# Patient Record
Sex: MALE | Race: BLACK OR AFRICAN AMERICAN | HISPANIC OR LATINO | URBAN - METROPOLITAN AREA
[De-identification: names, ages, dates, MRNs, and addresses within clinical notes are randomized per-mention and may not be internally consistent; named-entity substitution may affect disease eponyms.]

---

## 2017-03-11 ENCOUNTER — HOSPITAL ENCOUNTER (OUTPATIENT)
Dept: ADMINISTRATIVE | Facility: HOSPITAL | Age: 57
End: 2017-03-12
Attending: INTERNAL MEDICINE | Admitting: INTERNAL MEDICINE

## 2017-03-12 ENCOUNTER — HOSPITAL ENCOUNTER (OUTPATIENT)
Dept: MEDSURG UNIT | Facility: HOSPITAL | Age: 57
End: 2017-03-13
Attending: INTERNAL MEDICINE | Admitting: INTERNAL MEDICINE

## 2023-07-01 ENCOUNTER — HOSPITAL ENCOUNTER (OUTPATIENT)
Facility: HOSPITAL | Age: 63
Discharge: LEFT BEFORE DISCHARGE | End: 2023-07-03
Attending: HOSPITALIST | Admitting: HOSPITALIST

## 2023-07-01 DIAGNOSIS — I25.10 CORONARY ARTERY DISEASE, UNSPECIFIED VESSEL OR LESION TYPE, UNSPECIFIED WHETHER ANGINA PRESENT, UNSPECIFIED WHETHER NATIVE OR TRANSPLANTED HEART: ICD-10-CM

## 2023-07-01 DIAGNOSIS — E78.5 HYPERLIPIDEMIA, UNSPECIFIED HYPERLIPIDEMIA TYPE: ICD-10-CM

## 2023-07-01 DIAGNOSIS — I15.2 HYPERTENSION ASSOCIATED WITH DIABETES: ICD-10-CM

## 2023-07-01 DIAGNOSIS — R07.9 CHEST PAIN: Primary | ICD-10-CM

## 2023-07-01 DIAGNOSIS — T78.40XA ALLERGIC REACTION, INITIAL ENCOUNTER: ICD-10-CM

## 2023-07-01 DIAGNOSIS — I50.9 CONGESTIVE HEART FAILURE, UNSPECIFIED HF CHRONICITY, UNSPECIFIED HEART FAILURE TYPE: ICD-10-CM

## 2023-07-01 DIAGNOSIS — E11.59 HYPERTENSION ASSOCIATED WITH DIABETES: ICD-10-CM

## 2023-07-01 DIAGNOSIS — R07.89 CHEST HEAVINESS: ICD-10-CM

## 2023-07-01 PROBLEM — E11.9 TYPE 2 DIABETES MELLITUS, WITH LONG-TERM CURRENT USE OF INSULIN: Status: ACTIVE | Noted: 2023-07-01

## 2023-07-01 PROBLEM — Z79.4 TYPE 2 DIABETES MELLITUS, WITH LONG-TERM CURRENT USE OF INSULIN: Status: ACTIVE | Noted: 2023-07-01

## 2023-07-01 LAB
ALBUMIN SERPL BCP-MCNC: 3.4 G/DL (ref 3.5–5.2)
ALBUMIN SERPL BCP-MCNC: 4.1 G/DL (ref 3.5–5.2)
ALP SERPL-CCNC: 170 U/L (ref 55–135)
ALP SERPL-CCNC: 244 U/L (ref 55–135)
ALT SERPL W/O P-5'-P-CCNC: 13 U/L (ref 10–44)
ALT SERPL W/O P-5'-P-CCNC: 15 U/L (ref 10–44)
ANION GAP SERPL CALC-SCNC: 12 MMOL/L (ref 8–16)
ANION GAP SERPL CALC-SCNC: 13 MMOL/L (ref 8–16)
AST SERPL-CCNC: 14 U/L (ref 10–40)
AST SERPL-CCNC: 16 U/L (ref 10–40)
BASOPHILS # BLD AUTO: 0.02 K/UL (ref 0–0.2)
BASOPHILS NFR BLD: 0.3 % (ref 0–1.9)
BILIRUB SERPL-MCNC: 0.4 MG/DL (ref 0.1–1)
BILIRUB SERPL-MCNC: 0.4 MG/DL (ref 0.1–1)
BNP SERPL-MCNC: 13 PG/ML (ref 0–99)
BUN SERPL-MCNC: 15 MG/DL (ref 8–23)
BUN SERPL-MCNC: 15 MG/DL (ref 8–23)
CALCIUM SERPL-MCNC: 8.7 MG/DL (ref 8.7–10.5)
CALCIUM SERPL-MCNC: 9.4 MG/DL (ref 8.7–10.5)
CHLORIDE SERPL-SCNC: 100 MMOL/L (ref 95–110)
CHLORIDE SERPL-SCNC: 99 MMOL/L (ref 95–110)
CO2 SERPL-SCNC: 20 MMOL/L (ref 23–29)
CO2 SERPL-SCNC: 23 MMOL/L (ref 23–29)
CREAT SERPL-MCNC: 1.2 MG/DL (ref 0.5–1.4)
CREAT SERPL-MCNC: 1.4 MG/DL (ref 0.5–1.4)
DIFFERENTIAL METHOD: ABNORMAL
EOSINOPHIL # BLD AUTO: 0.1 K/UL (ref 0–0.5)
EOSINOPHIL NFR BLD: 1.2 % (ref 0–8)
ERYTHROCYTE [DISTWIDTH] IN BLOOD BY AUTOMATED COUNT: 15.6 % (ref 11.5–14.5)
EST. GFR  (NO RACE VARIABLE): 56 ML/MIN/1.73 M^2
EST. GFR  (NO RACE VARIABLE): >60 ML/MIN/1.73 M^2
GLUCOSE SERPL-MCNC: 405 MG/DL (ref 70–110)
GLUCOSE SERPL-MCNC: 418 MG/DL (ref 70–110)
GLUCOSE SERPL-MCNC: 558 MG/DL (ref 70–110)
HCT VFR BLD AUTO: 38.4 % (ref 40–54)
HGB BLD-MCNC: 12.4 G/DL (ref 14–18)
IMM GRANULOCYTES # BLD AUTO: 0.02 K/UL (ref 0–0.04)
IMM GRANULOCYTES NFR BLD AUTO: 0.3 % (ref 0–0.5)
LYMPHOCYTES # BLD AUTO: 2.5 K/UL (ref 1–4.8)
LYMPHOCYTES NFR BLD: 42.8 % (ref 18–48)
MCH RBC QN AUTO: 24.7 PG (ref 27–31)
MCHC RBC AUTO-ENTMCNC: 32.3 G/DL (ref 32–36)
MCV RBC AUTO: 77 FL (ref 82–98)
MONOCYTES # BLD AUTO: 0.4 K/UL (ref 0.3–1)
MONOCYTES NFR BLD: 7.6 % (ref 4–15)
NEUTROPHILS # BLD AUTO: 2.8 K/UL (ref 1.8–7.7)
NEUTROPHILS NFR BLD: 47.8 % (ref 38–73)
NRBC BLD-RTO: 0 /100 WBC
PLATELET # BLD AUTO: 315 K/UL (ref 150–450)
PMV BLD AUTO: 10.9 FL (ref 9.2–12.9)
POCT GLUCOSE: 403 MG/DL (ref 70–110)
POCT GLUCOSE: 440 MG/DL (ref 70–110)
POCT GLUCOSE: 471 MG/DL (ref 70–110)
POCT GLUCOSE: >500 MG/DL (ref 70–110)
POTASSIUM SERPL-SCNC: 3.8 MMOL/L (ref 3.5–5.1)
POTASSIUM SERPL-SCNC: 4.2 MMOL/L (ref 3.5–5.1)
PROT SERPL-MCNC: 6.5 G/DL (ref 6–8.4)
PROT SERPL-MCNC: 7.9 G/DL (ref 6–8.4)
RBC # BLD AUTO: 5.02 M/UL (ref 4.6–6.2)
SODIUM SERPL-SCNC: 132 MMOL/L (ref 136–145)
SODIUM SERPL-SCNC: 135 MMOL/L (ref 136–145)
TROPONIN I SERPL DL<=0.01 NG/ML-MCNC: <0.006 NG/ML (ref 0–0.03)
WBC # BLD AUTO: 5.82 K/UL (ref 3.9–12.7)

## 2023-07-01 PROCEDURE — 96375 TX/PRO/DX INJ NEW DRUG ADDON: CPT

## 2023-07-01 PROCEDURE — 93010 ELECTROCARDIOGRAM REPORT: CPT | Mod: ,,, | Performed by: INTERNAL MEDICINE

## 2023-07-01 PROCEDURE — 25000003 PHARM REV CODE 250: Performed by: STUDENT IN AN ORGANIZED HEALTH CARE EDUCATION/TRAINING PROGRAM

## 2023-07-01 PROCEDURE — 36415 COLL VENOUS BLD VENIPUNCTURE: CPT | Performed by: STUDENT IN AN ORGANIZED HEALTH CARE EDUCATION/TRAINING PROGRAM

## 2023-07-01 PROCEDURE — 93010 ELECTROCARDIOGRAM REPORT: CPT | Mod: 76,,, | Performed by: INTERNAL MEDICINE

## 2023-07-01 PROCEDURE — 82947 ASSAY GLUCOSE BLOOD QUANT: CPT | Performed by: NURSE PRACTITIONER

## 2023-07-01 PROCEDURE — 99900035 HC TECH TIME PER 15 MIN (STAT)

## 2023-07-01 PROCEDURE — A4216 STERILE WATER/SALINE, 10 ML: HCPCS | Performed by: HOSPITALIST

## 2023-07-01 PROCEDURE — 82962 GLUCOSE BLOOD TEST: CPT

## 2023-07-01 PROCEDURE — 93005 ELECTROCARDIOGRAM TRACING: CPT

## 2023-07-01 PROCEDURE — 83880 ASSAY OF NATRIURETIC PEPTIDE: CPT | Performed by: STUDENT IN AN ORGANIZED HEALTH CARE EDUCATION/TRAINING PROGRAM

## 2023-07-01 PROCEDURE — 84484 ASSAY OF TROPONIN QUANT: CPT | Mod: 91 | Performed by: HOSPITALIST

## 2023-07-01 PROCEDURE — 80053 COMPREHEN METABOLIC PANEL: CPT | Performed by: STUDENT IN AN ORGANIZED HEALTH CARE EDUCATION/TRAINING PROGRAM

## 2023-07-01 PROCEDURE — 99285 EMERGENCY DEPT VISIT HI MDM: CPT | Mod: 25

## 2023-07-01 PROCEDURE — 36410 VNPNXR 3YR/> PHY/QHP DX/THER: CPT

## 2023-07-01 PROCEDURE — 94761 N-INVAS EAR/PLS OXIMETRY MLT: CPT

## 2023-07-01 PROCEDURE — 84484 ASSAY OF TROPONIN QUANT: CPT | Performed by: STUDENT IN AN ORGANIZED HEALTH CARE EDUCATION/TRAINING PROGRAM

## 2023-07-01 PROCEDURE — 96361 HYDRATE IV INFUSION ADD-ON: CPT

## 2023-07-01 PROCEDURE — 96372 THER/PROPH/DIAG INJ SC/IM: CPT | Mod: 59 | Performed by: HOSPITALIST

## 2023-07-01 PROCEDURE — 63600175 PHARM REV CODE 636 W HCPCS: Performed by: STUDENT IN AN ORGANIZED HEALTH CARE EDUCATION/TRAINING PROGRAM

## 2023-07-01 PROCEDURE — 63600175 PHARM REV CODE 636 W HCPCS: Performed by: NURSE PRACTITIONER

## 2023-07-01 PROCEDURE — 63600175 PHARM REV CODE 636 W HCPCS: Performed by: HOSPITALIST

## 2023-07-01 PROCEDURE — 87522 HEPATITIS C REVRS TRNSCRPJ: CPT | Performed by: EMERGENCY MEDICINE

## 2023-07-01 PROCEDURE — C1751 CATH, INF, PER/CENT/MIDLINE: HCPCS

## 2023-07-01 PROCEDURE — 36415 COLL VENOUS BLD VENIPUNCTURE: CPT | Performed by: HOSPITALIST

## 2023-07-01 PROCEDURE — 85025 COMPLETE CBC W/AUTO DIFF WBC: CPT | Performed by: STUDENT IN AN ORGANIZED HEALTH CARE EDUCATION/TRAINING PROGRAM

## 2023-07-01 PROCEDURE — 25000003 PHARM REV CODE 250: Performed by: NURSE PRACTITIONER

## 2023-07-01 PROCEDURE — 93010 EKG 12-LEAD: ICD-10-PCS | Mod: 76,,, | Performed by: INTERNAL MEDICINE

## 2023-07-01 PROCEDURE — 80053 COMPREHEN METABOLIC PANEL: CPT | Mod: 91 | Performed by: HOSPITALIST

## 2023-07-01 PROCEDURE — 25000003 PHARM REV CODE 250: Performed by: HOSPITALIST

## 2023-07-01 PROCEDURE — 87389 HIV-1 AG W/HIV-1&-2 AB AG IA: CPT | Performed by: EMERGENCY MEDICINE

## 2023-07-01 PROCEDURE — 96376 TX/PRO/DX INJ SAME DRUG ADON: CPT

## 2023-07-01 PROCEDURE — 36415 COLL VENOUS BLD VENIPUNCTURE: CPT | Performed by: NURSE PRACTITIONER

## 2023-07-01 PROCEDURE — 96374 THER/PROPH/DIAG INJ IV PUSH: CPT

## 2023-07-01 PROCEDURE — 25000242 PHARM REV CODE 250 ALT 637 W/ HCPCS: Performed by: HOSPITALIST

## 2023-07-01 PROCEDURE — G0378 HOSPITAL OBSERVATION PER HR: HCPCS

## 2023-07-01 RX ORDER — SODIUM CHLORIDE 0.9 % (FLUSH) 0.9 %
10 SYRINGE (ML) INJECTION EVERY 6 HOURS
Status: DISCONTINUED | OUTPATIENT
Start: 2023-07-01 | End: 2023-07-03 | Stop reason: HOSPADM

## 2023-07-01 RX ORDER — NAPROXEN SODIUM 220 MG/1
81 TABLET, FILM COATED ORAL DAILY
Status: DISCONTINUED | OUTPATIENT
Start: 2023-07-02 | End: 2023-07-03 | Stop reason: HOSPADM

## 2023-07-01 RX ORDER — GLUCAGON 1 MG
1 KIT INJECTION
Status: DISCONTINUED | OUTPATIENT
Start: 2023-07-01 | End: 2023-07-03 | Stop reason: HOSPADM

## 2023-07-01 RX ORDER — FAMOTIDINE 10 MG/ML
40 INJECTION INTRAVENOUS
Status: COMPLETED | OUTPATIENT
Start: 2023-07-01 | End: 2023-07-01

## 2023-07-01 RX ORDER — NALOXONE HCL 0.4 MG/ML
0.02 VIAL (ML) INJECTION
Status: DISCONTINUED | OUTPATIENT
Start: 2023-07-01 | End: 2023-07-03 | Stop reason: HOSPADM

## 2023-07-01 RX ORDER — IBUPROFEN 200 MG
16 TABLET ORAL
Status: DISCONTINUED | OUTPATIENT
Start: 2023-07-01 | End: 2023-07-03 | Stop reason: HOSPADM

## 2023-07-01 RX ORDER — MORPHINE SULFATE 2 MG/ML
2 INJECTION, SOLUTION INTRAMUSCULAR; INTRAVENOUS
Status: COMPLETED | OUTPATIENT
Start: 2023-07-01 | End: 2023-07-01

## 2023-07-01 RX ORDER — IBUPROFEN 200 MG
24 TABLET ORAL
Status: DISCONTINUED | OUTPATIENT
Start: 2023-07-01 | End: 2023-07-03 | Stop reason: HOSPADM

## 2023-07-01 RX ORDER — ACETAMINOPHEN 500 MG
1000 TABLET ORAL EVERY 8 HOURS PRN
Status: DISCONTINUED | OUTPATIENT
Start: 2023-07-01 | End: 2023-07-03 | Stop reason: HOSPADM

## 2023-07-01 RX ORDER — DIPHENHYDRAMINE HYDROCHLORIDE 50 MG/ML
25 INJECTION INTRAMUSCULAR; INTRAVENOUS
Status: DISCONTINUED | OUTPATIENT
Start: 2023-07-01 | End: 2023-07-01

## 2023-07-01 RX ORDER — NITROGLYCERIN 0.4 MG/1
0.4 TABLET SUBLINGUAL EVERY 5 MIN PRN
Status: COMPLETED | OUTPATIENT
Start: 2023-07-01 | End: 2023-07-01

## 2023-07-01 RX ORDER — SIMETHICONE 80 MG
1 TABLET,CHEWABLE ORAL 4 TIMES DAILY PRN
Status: DISCONTINUED | OUTPATIENT
Start: 2023-07-01 | End: 2023-07-03 | Stop reason: HOSPADM

## 2023-07-01 RX ORDER — SODIUM CHLORIDE 0.9 % (FLUSH) 0.9 %
10 SYRINGE (ML) INJECTION
Status: DISCONTINUED | OUTPATIENT
Start: 2023-07-01 | End: 2023-07-03 | Stop reason: HOSPADM

## 2023-07-01 RX ORDER — SODIUM CHLORIDE 0.9 % (FLUSH) 0.9 %
10 SYRINGE (ML) INJECTION EVERY 8 HOURS PRN
Status: DISCONTINUED | OUTPATIENT
Start: 2023-07-01 | End: 2023-07-03 | Stop reason: HOSPADM

## 2023-07-01 RX ORDER — METOCLOPRAMIDE HYDROCHLORIDE 5 MG/ML
5 INJECTION INTRAMUSCULAR; INTRAVENOUS ONCE
Status: COMPLETED | OUTPATIENT
Start: 2023-07-01 | End: 2023-07-01

## 2023-07-01 RX ORDER — PROCHLORPERAZINE EDISYLATE 5 MG/ML
5 INJECTION INTRAMUSCULAR; INTRAVENOUS EVERY 6 HOURS PRN
Status: DISCONTINUED | OUTPATIENT
Start: 2023-07-01 | End: 2023-07-03 | Stop reason: HOSPADM

## 2023-07-01 RX ORDER — ATORVASTATIN CALCIUM 20 MG/1
20 TABLET, FILM COATED ORAL NIGHTLY
Status: DISCONTINUED | OUTPATIENT
Start: 2023-07-01 | End: 2023-07-03 | Stop reason: HOSPADM

## 2023-07-01 RX ORDER — ONDANSETRON 2 MG/ML
4 INJECTION INTRAMUSCULAR; INTRAVENOUS
Status: COMPLETED | OUTPATIENT
Start: 2023-07-01 | End: 2023-07-01

## 2023-07-01 RX ORDER — LANOLIN ALCOHOL/MO/W.PET/CERES
800 CREAM (GRAM) TOPICAL
Status: DISCONTINUED | OUTPATIENT
Start: 2023-07-01 | End: 2023-07-03 | Stop reason: HOSPADM

## 2023-07-01 RX ORDER — POLYETHYLENE GLYCOL 3350 17 G/17G
17 POWDER, FOR SOLUTION ORAL DAILY
Status: DISCONTINUED | OUTPATIENT
Start: 2023-07-01 | End: 2023-07-03 | Stop reason: HOSPADM

## 2023-07-01 RX ORDER — SODIUM,POTASSIUM PHOSPHATES 280-250MG
2 POWDER IN PACKET (EA) ORAL
Status: DISCONTINUED | OUTPATIENT
Start: 2023-07-01 | End: 2023-07-03 | Stop reason: HOSPADM

## 2023-07-01 RX ORDER — ASPIRIN 325 MG
325 TABLET ORAL
Status: COMPLETED | OUTPATIENT
Start: 2023-07-01 | End: 2023-07-01

## 2023-07-01 RX ORDER — ONDANSETRON 4 MG/1
8 TABLET, ORALLY DISINTEGRATING ORAL EVERY 8 HOURS PRN
Status: DISCONTINUED | OUTPATIENT
Start: 2023-07-01 | End: 2023-07-03 | Stop reason: HOSPADM

## 2023-07-01 RX ORDER — MAG HYDROX/ALUMINUM HYD/SIMETH 200-200-20
30 SUSPENSION, ORAL (FINAL DOSE FORM) ORAL 4 TIMES DAILY PRN
Status: DISCONTINUED | OUTPATIENT
Start: 2023-07-01 | End: 2023-07-03 | Stop reason: HOSPADM

## 2023-07-01 RX ORDER — INSULIN ASPART 100 [IU]/ML
15 INJECTION, SOLUTION INTRAVENOUS; SUBCUTANEOUS
Status: DISCONTINUED | OUTPATIENT
Start: 2023-07-01 | End: 2023-07-03 | Stop reason: HOSPADM

## 2023-07-01 RX ORDER — IPRATROPIUM BROMIDE AND ALBUTEROL SULFATE 2.5; .5 MG/3ML; MG/3ML
3 SOLUTION RESPIRATORY (INHALATION) EVERY 6 HOURS PRN
Status: DISCONTINUED | OUTPATIENT
Start: 2023-07-01 | End: 2023-07-03 | Stop reason: HOSPADM

## 2023-07-01 RX ORDER — METHYLPREDNISOLONE SOD SUCC 125 MG
125 VIAL (EA) INJECTION
Status: COMPLETED | OUTPATIENT
Start: 2023-07-01 | End: 2023-07-01

## 2023-07-01 RX ORDER — DIPHENHYDRAMINE HYDROCHLORIDE 50 MG/ML
50 INJECTION INTRAMUSCULAR; INTRAVENOUS
Status: COMPLETED | OUTPATIENT
Start: 2023-07-01 | End: 2023-07-01

## 2023-07-01 RX ORDER — DIPHENHYDRAMINE HCL 25 MG
25 CAPSULE ORAL EVERY 6 HOURS PRN
Status: DISCONTINUED | OUTPATIENT
Start: 2023-07-01 | End: 2023-07-03 | Stop reason: HOSPADM

## 2023-07-01 RX ORDER — TALC
6 POWDER (GRAM) TOPICAL NIGHTLY PRN
Status: DISCONTINUED | OUTPATIENT
Start: 2023-07-01 | End: 2023-07-03 | Stop reason: HOSPADM

## 2023-07-01 RX ORDER — ENOXAPARIN SODIUM 100 MG/ML
40 INJECTION SUBCUTANEOUS EVERY 24 HOURS
Status: DISCONTINUED | OUTPATIENT
Start: 2023-07-01 | End: 2023-07-03 | Stop reason: HOSPADM

## 2023-07-01 RX ORDER — INSULIN ASPART 100 [IU]/ML
1-10 INJECTION, SOLUTION INTRAVENOUS; SUBCUTANEOUS
Status: DISCONTINUED | OUTPATIENT
Start: 2023-07-01 | End: 2023-07-03 | Stop reason: HOSPADM

## 2023-07-01 RX ORDER — ACETAMINOPHEN 325 MG/1
650 TABLET ORAL EVERY 4 HOURS PRN
Status: DISCONTINUED | OUTPATIENT
Start: 2023-07-01 | End: 2023-07-03 | Stop reason: HOSPADM

## 2023-07-01 RX ADMIN — INSULIN DETEMIR 35 UNITS: 100 INJECTION, SOLUTION SUBCUTANEOUS at 10:07

## 2023-07-01 RX ADMIN — ENOXAPARIN SODIUM 40 MG: 40 INJECTION SUBCUTANEOUS at 04:07

## 2023-07-01 RX ADMIN — METHYLPREDNISOLONE SODIUM SUCCINATE 125 MG: 125 INJECTION, POWDER, FOR SOLUTION INTRAMUSCULAR; INTRAVENOUS at 09:07

## 2023-07-01 RX ADMIN — METOCLOPRAMIDE 5 MG: 5 INJECTION, SOLUTION INTRAMUSCULAR; INTRAVENOUS at 10:07

## 2023-07-01 RX ADMIN — NITROGLYCERIN 0.4 MG: 0.4 TABLET, ORALLY DISINTEGRATING SUBLINGUAL at 04:07

## 2023-07-01 RX ADMIN — ONDANSETRON 4 MG: 2 INJECTION INTRAMUSCULAR; INTRAVENOUS at 10:07

## 2023-07-01 RX ADMIN — MORPHINE SULFATE 2 MG: 2 INJECTION, SOLUTION INTRAMUSCULAR; INTRAVENOUS at 10:07

## 2023-07-01 RX ADMIN — ASPIRIN 325 MG: 325 TABLET ORAL at 09:07

## 2023-07-01 RX ADMIN — ATORVASTATIN CALCIUM 20 MG: 20 TABLET, FILM COATED ORAL at 09:07

## 2023-07-01 RX ADMIN — DIPHENHYDRAMINE HYDROCHLORIDE 50 MG: 50 INJECTION INTRAMUSCULAR; INTRAVENOUS at 09:07

## 2023-07-01 RX ADMIN — INSULIN ASPART 10 UNITS: 100 INJECTION, SOLUTION INTRAVENOUS; SUBCUTANEOUS at 04:07

## 2023-07-01 RX ADMIN — INSULIN ASPART 15 UNITS: 100 INJECTION, SOLUTION INTRAVENOUS; SUBCUTANEOUS at 04:07

## 2023-07-01 RX ADMIN — MORPHINE SULFATE 2 MG: 2 INJECTION, SOLUTION INTRAMUSCULAR; INTRAVENOUS at 11:07

## 2023-07-01 RX ADMIN — SODIUM CHLORIDE, PRESERVATIVE FREE 10 ML: 5 INJECTION INTRAVENOUS at 07:07

## 2023-07-01 RX ADMIN — INSULIN HUMAN 10 UNITS: 100 INJECTION, SOLUTION PARENTERAL at 09:07

## 2023-07-01 RX ADMIN — SODIUM CHLORIDE 500 ML: 0.9 INJECTION, SOLUTION INTRAVENOUS at 09:07

## 2023-07-01 RX ADMIN — FAMOTIDINE 40 MG: 10 INJECTION, SOLUTION INTRAVENOUS at 09:07

## 2023-07-01 RX ADMIN — NITROGLYCERIN 0.4 MG: 0.4 TABLET, ORALLY DISINTEGRATING SUBLINGUAL at 05:07

## 2023-07-01 RX ADMIN — NITROGLYCERIN 1 INCH: 20 OINTMENT TOPICAL at 11:07

## 2023-07-01 NOTE — LETTER
Patient: Dax Soni  YOB: 1961  Date: 7/1/2023 Time: 10:00 PM  Location: [facility]    Leaving the Hospital Against Medical Advice    Chart #:[id number]    This will certify that I, the undersigned,    ______________________________________________________________________    A patient in the above named medical center, having requested discharge and removal from the medical center against the advice of my attending physician(s), hereby release [facility], its physicians, officers and employees, severally and individually, from any and all liability of any nature whatsoever for any injury or harm or complication of any kind that may result directly or indirectly, by reason of my terminating my stay as a patient at [facility] and my departure from [facility], and hereby waive any and all rights of action I may now have or later acquire as a result of my voluntary departure from [facility] and the termination of my stay as a patient therein.    This release is made with the full knowledge of the danger that may result from the action which I am taking.      Date:_______________________                         ___________________________                                                                                    Patient/Legal Representative    Witness:        ____________________________                          ___________________________  Nurse                                                                        Physician

## 2023-07-01 NOTE — SUBJECTIVE & OBJECTIVE
Past Medical History:   Diagnosis Date    CHF (congestive heart failure)     Coronary artery disease     Diabetes mellitus     Hypertension        History reviewed. No pertinent surgical history.    Review of patient's allergies indicates:   Allergen Reactions    Pcn [penicillins]        No current facility-administered medications on file prior to encounter.     No current outpatient medications on file prior to encounter.     Family History       Problem Relation (Age of Onset)    Diabetes Mother, Father    Hypertension Mother, Father          Tobacco Use    Smoking status: Never    Smokeless tobacco: Never   Substance and Sexual Activity    Alcohol use: Never    Drug use: Never    Sexual activity: Not on file     Review of Systems   Constitutional:  Positive for diaphoresis. Negative for chills and fever.   HENT:  Positive for facial swelling. Negative for congestion and rhinorrhea.    Respiratory:  Positive for shortness of breath. Negative for cough and wheezing.    Cardiovascular:  Positive for chest pain. Negative for palpitations and leg swelling.   Gastrointestinal:  Positive for nausea and vomiting. Negative for abdominal distention and abdominal pain.   Endocrine: Negative for cold intolerance and heat intolerance.   Genitourinary:  Negative for dysuria and urgency.   Musculoskeletal:  Negative for arthralgias and neck stiffness.   Skin:  Negative for rash and wound.   Neurological:  Negative for dizziness and weakness.   Objective:     Vital Signs (Most Recent):  Temp: 97.9 °F (36.6 °C) (07/01/23 0917)  Pulse: 85 (07/01/23 1102)  Resp: 17 (07/01/23 1103)  BP: (!) 137/97 (07/01/23 1102)  SpO2: 98 % (07/01/23 1102) Vital Signs (24h Range):  Temp:  [97.9 °F (36.6 °C)] 97.9 °F (36.6 °C)  Pulse:  [] 85  Resp:  [17-19] 17  SpO2:  [98 %-100 %] 98 %  BP: (122-137)/(91-97) 137/97     Weight: 99.8 kg (220 lb)  Body mass index is 34.46 kg/m².     Physical Exam  Constitutional:       General: He is not in  acute distress.     Appearance: He is well-developed.   HENT:      Head: Normocephalic and atraumatic.      Mouth/Throat:      Mouth: Mucous membranes are moist.      Pharynx: Oropharynx is clear.   Eyes:      Pupils: Pupils are equal, round, and reactive to light.   Cardiovascular:      Rate and Rhythm: Normal rate and regular rhythm.      Heart sounds: No murmur heard.  Pulmonary:      Effort: Pulmonary effort is normal. No respiratory distress.      Breath sounds: Normal breath sounds. No wheezing or rales.   Abdominal:      General: Bowel sounds are normal. There is no distension.      Palpations: Abdomen is soft.      Tenderness: There is no abdominal tenderness.   Musculoskeletal:         General: Normal range of motion.   Skin:     General: Skin is warm and dry.      Findings: No rash.   Neurological:      Mental Status: He is alert and oriented to person, place, and time.      Cranial Nerves: No cranial nerve deficit.   Psychiatric:         Behavior: Behavior normal.            CRANIAL NERVES     CN III, IV, VI   Pupils are equal, round, and reactive to light.     Significant Labs: All pertinent labs within the past 24 hours have been reviewed.    Significant Imaging: I have reviewed all pertinent imaging results/findings within the past 24 hours.

## 2023-07-01 NOTE — HPI
Patient is a 61-year-old male with self-reported history of CHF, CAD status post 4 stents, last in 2017 in [place], diabetes, hypertension, hyperlipidemia who was seen today in the ED for chest pain and concern for allergic reaction.  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  He states that he started to have chest pain around 8:00 a.m. today.  It was associated with diaphoresis, nausea, vomiting.  He says he feels like it as an elephant on his chest and radiates up to his jaw down to his arm and is a 9/10 pain.  He reports the pain has been constant since this morning.  He reports not having any pain like this since 2017 when he had his last stent.  He reports he took 3 nitroglycerin and 4 baby aspirins and went to a Zoroastrianism breakfast thinking the pain would go away.  He ate something that had onion and tomato when it which he reports he is allergic to and began to have an allergic reaction with throat itching and reported swelling and presented to the ED for evaluation.  He was given Benadryl, steroids, Pepcid in the ED for the allergic reaction.  The ED physician offered to give him epinephrine but patient declined.  He has been given morphine for the chest pain.  He reports he is still having chest pain.  EKG and troponin negative.    Chart reviewed and patient has multiple different MRNs with slightly different addresses and birthdays.  He has presented with a similar story like this multiple times.  He had an angiogram in 2018 which showed no stents and clean coronaries.  He has left AMA multiple times from the hospital after not getting IV pain medications.    We will place the patient on observation and monitor him closely.  He will undergo cardiac workup for his chest pain.  Cardiology will be consulted.  Other MRNs  [id number]  [id number]  [id number]  [id number]  [id number] - this has the 2018 cath report - no CAD, no stents  [id number]  [id number]  [id number]  [id number]  [id number]

## 2023-07-01 NOTE — ASSESSMENT & PLAN NOTE
Patient reports being on Lisinopril, metoprolol, imdur, lasix    BP low/normal at this time  Monitor BP, introduce medications as needed

## 2023-07-01 NOTE — ASSESSMENT & PLAN NOTE
Patient's FSGs are uncontrolled due to hyperglycemia on current medication regimen.  Last A1c reviewed- No results found for: LABA1C, HGBA1C  Most recent fingerstick glucose reviewed- No results for input(s): POCTGLUCOSE in the last 24 hours.  Current correctional scale  Medium  Increase anti-hyperglycemic dose as follows-   Antihyperglycemics (From admission, onward)    None        Hold Oral hypoglycemics while patient is in the hospital.

## 2023-07-01 NOTE — ED NOTES
Dr Fonseca and Dr Whitney notified of pt's worsening chest pain, ecg changes and most recent EKG. Both Drs also spoke to each other. No new orders. Pt c/o increase cp but no changes in type of pain or location. No signs of distress.

## 2023-07-01 NOTE — ASSESSMENT & PLAN NOTE
Patient reports h/o CAD  BNP wnl  Monitor volume status  Recent Labs   Lab 07/01/23  0950   BNP 13   .

## 2023-07-01 NOTE — PLAN OF CARE
[facility] - ED  Initial Discharge Assessment       Primary Care Provider: No primary care provider on file.    Admission Diagnosis: Chest pain [R07.9]    Admission Date: 7/1/2023  Expected Discharge Date:     Transition of Care Barriers: Transportation    Payor: MEDICAID / Plan: MEDICAID OF [place] / Product Type: Government /     No emergency contact information on file.    Discharge Plan A: Home  Discharge Plan B: Home with family      [id number] - [place], [place] - [address]  [address] & [facility]  [address] [facility] [address]  Phone: [phone] Fax: [phone]    SW met with patient at bedside to complete discharge planning assessment.  Patient alert and oriented xs 4.  Patient verified all demographic information on facesheet is correct.  Patient verified he has NO PCP.  Patient verified primary health insurance is [place] Medicaid  Patient with NO home health or DME.  Patient with NO POA or Living Will.  Patient not on dialysis or medication coumadin.  Patient with no 30 day admission.  Patient with no financial issues at this time.  Patient MAY NEED transportation upon discharge from facility.  Patient independent with ADLs, live alone, drives self.      Initial Assessment (most recent)       Adult Discharge Assessment - 07/01/23 1521          Discharge Assessment    Assessment Type Discharge Planning Assessment     Confirmed/corrected address, phone number and insurance Yes     Confirmed Demographics Correct on Facesheet     Source of Information patient     Does patient/caregiver understand observation status Yes     Communicated RAJAT with patient/caregiver Yes     People in Home alone     Facility Arrived From: home     Do you expect to return to your current living situation? Yes     Do you have help at home or someone to help you manage your care at home? Yes     Who are your caregiver(s) and their phone number(s)? self     Prior to hospitilization cognitive status:  Alert/Oriented     Current cognitive status: Alert/Oriented     Equipment Currently Used at Home none     Readmission within 30 days? No     Patient currently being followed by outpatient case management? No     Do you currently have service(s) that help you manage your care at home? No     Do you take prescription medications? Yes     Do you have prescription coverage? Yes     Do you have any problems affording any of your prescribed medications? No     Is the patient taking medications as prescribed? yes     Who is going to help you get home at discharge? self     How do you get to doctors appointments? car, drives self     Are you on dialysis? No     Do you take coumadin? No     Discharge Plan A Home     Discharge Plan B Home with family     DME Needed Upon Discharge  none     Discharge Plan discussed with: Patient     Transition of Care Barriers Transportation        Physical Activity    On average, how many days per week do you engage in moderate to strenuous exercise (like a brisk walk)? Patient refused     On average, how many minutes do you engage in exercise at this level? Patient refused        Financial Resource Strain    How hard is it for you to pay for the very basics like food, housing, medical care, and heating? Patient refused        Housing Stability    In the last 12 months, was there a time when you were not able to pay the mortgage or rent on time? Patient refused     In the last 12 months, was there a time when you did not have a steady place to sleep or slept in a shelter (including now)? Patient refused        Transportation Needs    In the past 12 months, has lack of transportation kept you from medical appointments or from getting medications? Patient refused     In the past 12 months, has lack of transportation kept you from meetings, work, or from getting things needed for daily living? Patient refused        Food Insecurity    Within the past 12 months, you worried that your food would  run out before you got the money to buy more. Patient refused     Within the past 12 months, the food you bought just didn't last and you didn't have money to get more. Patient refused        Stress    Do you feel stress - tense, restless, nervous, or anxious, or unable to sleep at night because your mind is troubled all the time - these days? Patient refused        Social Connections    In a typical week, how many times do you talk on the phone with family, friends, or neighbors? Patient refused     How often do you get together with friends or relatives? Patient refused     How often do you attend Spiritism or Hinduism services? Patient refused     Do you belong to any clubs or organizations such as Spiritism groups, unions, fraternal or athletic groups, or school groups? Patient refused     How often do you attend meetings of the clubs or organizations you belong to? Patient refused     Are you , , , , never , or living with a partner? Patient refused        Alcohol Use    Q1: How often do you have a drink containing alcohol? Patient refused     Q2: How many drinks containing alcohol do you have on a typical day when you are drinking? Patient refused     Q3: How often do you have six or more drinks on one occasion? Patient refused

## 2023-07-01 NOTE — ED NOTES
"Pt c/o L sided chest heaviness that started a "couple of hours ago" and he ate something hoping it would help but states he is allergic to it and feels like he is having an anaphylactic reaction. No signs of distress at this time. MD at bedside, meds ordered. Attempting to get IV. 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 Pt states he took 3 nitro and 4 baby asa PTA with no relief.   "

## 2023-07-01 NOTE — H&P
[facility]  History & Physical    Patient Name: Dax Soni  MRN: [id number]  Patient Class: OP- Observation  Admission Date: 7/1/2023  Attending Physician: Gogo Fonseca MD  Primary Care Provider: No primary care provider on file.         Patient information was obtained from patient, past medical records and ER records.     Subjective:     Principal Problem:Chest pain    Chief Complaint:   Chief Complaint   Patient presents with    Chest Pain     X 2 hours     Allergic Reaction     X 45 minutes after eating onions         HPI: Patient is a 61-year-old male with self-reported history of CHF, CAD status post 4 stents, last in 2017 in [place], diabetes, hypertension, hyperlipidemia who was seen today in the ED for chest pain and concern for allergic reaction.  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  He states that he started to have chest pain around 8:00 a.m. today.  It was associated with diaphoresis, nausea, vomiting.  He says he feels like it as an elephant on his chest and radiates up to his jaw down to his arm and is a 9/10 pain.  He reports the pain has been constant since this morning.  He reports not having any pain like this since 2017 when he had his last stent.  He reports he took 3 nitroglycerin and 4 baby aspirins and went to a [employer] breakfast thinking the pain would go away.  He ate something that had onion and tomato when it which he reports he is allergic to and began to have an allergic reaction with throat itching and reported swelling and presented to the ED for evaluation.  He was given Benadryl, steroids, Pepcid in the ED for the allergic reaction.  The ED physician offered to give him epinephrine but patient declined.  He has been given morphine for the chest pain.  He reports he is still having chest pain.  EKG and troponin negative.    Chart reviewed and patient has multiple different MRNs with slightly different addresses and birthdays.  He has  presented with a similar story like this multiple times.  He had an angiogram in 2018 which showed no stents and clean coronaries.  He has left AMA multiple times from the hospital after not getting IV pain medications.    We will place the patient on observation and monitor him closely.  He will undergo cardiac workup for his chest pain.  Cardiology will be consulted.  Other MRNs  [id number]  [id number]  [id number]  [id number]  [id number] - this has the 2018 cath report - no CAD, no stents  [id number]  [id number]  [id number]  [id number]  [id number]           Past Medical History:   Diagnosis Date    CHF (congestive heart failure)     Coronary artery disease     Diabetes mellitus     Hypertension        History reviewed. No pertinent surgical history.    Review of patient's allergies indicates:   Allergen Reactions    Pcn [penicillins]        No current facility-administered medications on file prior to encounter.     No current outpatient medications on file prior to encounter.     Family History       Problem Relation (Age of Onset)    Diabetes Mother, Father    Hypertension Mother, Father          Tobacco Use    Smoking status: Never    Smokeless tobacco: Never   Substance and Sexual Activity    Alcohol use: Never    Drug use: Never    Sexual activity: Not on file     Review of Systems   Constitutional:  Positive for diaphoresis. Negative for chills and fever.   HENT:  Positive for facial swelling. Negative for congestion and rhinorrhea.    Respiratory:  Positive for shortness of breath. Negative for cough and wheezing.    Cardiovascular:  Positive for chest pain. Negative for palpitations and leg swelling.   Gastrointestinal:  Positive for nausea and vomiting. Negative for abdominal distention and abdominal pain.   Endocrine: Negative for cold intolerance and heat intolerance.   Genitourinary:  Negative for dysuria and urgency.   Musculoskeletal:  Negative for arthralgias and neck stiffness.   Skin:  Negative for rash and  wound.   Neurological:  Negative for dizziness and weakness.   Objective:     Vital Signs (Most Recent):  Temp: 97.9 °F (36.6 °C) (07/01/23 0917)  Pulse: 85 (07/01/23 1102)  Resp: 17 (07/01/23 1103)  BP: (!) 137/97 (07/01/23 1102)  SpO2: 98 % (07/01/23 1102) Vital Signs (24h Range):  Temp:  [97.9 °F (36.6 °C)] 97.9 °F (36.6 °C)  Pulse:  [] 85  Resp:  [17-19] 17  SpO2:  [98 %-100 %] 98 %  BP: (122-137)/(91-97) 137/97     Weight: 99.8 kg (220 lb)  Body mass index is 34.46 kg/m².     Physical Exam  Constitutional:       General: He is not in acute distress.     Appearance: He is well-developed.   HENT:      Head: Normocephalic and atraumatic.      Mouth/Throat:      Mouth: Mucous membranes are moist.      Pharynx: Oropharynx is clear.   Eyes:      Pupils: Pupils are equal, round, and reactive to light.   Cardiovascular:      Rate and Rhythm: Normal rate and regular rhythm.      Heart sounds: No murmur heard.  Pulmonary:      Effort: Pulmonary effort is normal. No respiratory distress.      Breath sounds: Normal breath sounds. No wheezing or rales.   Abdominal:      General: Bowel sounds are normal. There is no distension.      Palpations: Abdomen is soft.      Tenderness: There is no abdominal tenderness.   Musculoskeletal:         General: Normal range of motion.   Skin:     General: Skin is warm and dry.      Findings: No rash.   Neurological:      Mental Status: He is alert and oriented to person, place, and time.      Cranial Nerves: No cranial nerve deficit.   Psychiatric:         Behavior: Behavior normal.            CRANIAL NERVES     CN III, IV, VI   Pupils are equal, round, and reactive to light.     Significant Labs: All pertinent labs within the past 24 hours have been reviewed.    Significant Imaging: I have reviewed all pertinent imaging results/findings within the past 24 hours.    Assessment/Plan:     * Chest pain  Trend troponins  Telemetry monitoring  Cardiology consult        CAD (coronary  artery disease)  Patient reports 4 stents, last placed in 2017  No obstructive CAD and no stents seen on cath in 2018 per chart review  Telemetry monitoring        Hypertension associated with diabetes  Patient reports being on Lisinopril, metoprolol, imdur, lasix    BP low/normal at this time  Monitor BP, introduce medications as needed    Hyperlipidemia   Patient is chronically on statin.will continue for now. Monitor clinically. Last LDL was No results found for: LDLCALC         Type 2 diabetes mellitus, with long-term current use of insulin  Patient's FSGs are uncontrolled due to hyperglycemia on current medication regimen.  Last A1c reviewed- No results found for: LABA1C, HGBA1C  Most recent fingerstick glucose reviewed- No results for input(s): POCTGLUCOSE in the last 24 hours.  Current correctional scale  Medium  Increase anti-hyperglycemic dose as follows-   Antihyperglycemics (From admission, onward)    None        Hold Oral hypoglycemics while patient is in the hospital.    CHF (congestive heart failure)  Patient reports h/o CAD  BNP wnl  Monitor volume status  Recent Labs   Lab 07/01/23  0950   BNP 13   .          VTE Risk Mitigation (From admission, onward)    None             On 07/01/2023, patient should be placed in hospital observation services under my care.        Gogo Fonseca MD  Department [facility]  [facility]

## 2023-07-01 NOTE — ASSESSMENT & PLAN NOTE
Patient reports 4 stents, last placed in 2017  No obstructive CAD and no stents seen on cath in 2018 per chart review  Telemetry monitoring

## 2023-07-01 NOTE — PROVIDER PROGRESS NOTES - EMERGENCY DEPT.
Encounter Date: 7/1/2023    ED Physician Progress Notes        Physician Note:   Repeat EKG performed .  Admitting team notified , ( Yolanda ) They will interpreted for further management / evaluation.

## 2023-07-01 NOTE — ED PROVIDER NOTES
Encounter Date: 7/1/2023       History     Chief Complaint   Patient presents with    Chest Pain     X 2 hours     Allergic Reaction     X 45 minutes after eating onions      61-year-old male self-reported past medical history of CAD presents with chest pain and associated nausea.  Patient also reports possible allergic reaction prior to arrival.  Moderate to severe severity associated dyspnea subjectively.  No trauma, fever or chills.      Review of patient's allergies indicates:   Allergen Reactions    Pcn [penicillins]      Past Medical History:   Diagnosis Date    CHF (congestive heart failure)     Coronary artery disease     Diabetes mellitus     Hypertension      History reviewed. No pertinent surgical history.  Family History   Problem Relation Age of Onset    Diabetes Mother     Hypertension Mother     Diabetes Father     Hypertension Father      Social History     Tobacco Use    Smoking status: Never    Smokeless tobacco: Never   Substance Use Topics    Alcohol use: Never    Drug use: Never     Review of Systems   All other systems reviewed and are negative.    Physical Exam     Initial Vitals [07/01/23 0917]   BP Pulse Resp Temp SpO2   (!) 122/91 (!) 126 18 97.9 °F (36.6 °C) 100 %      MAP       --         Physical Exam    Nursing note and vitals reviewed.  Constitutional: Vital signs are normal. He is not diaphoretic.  Non-toxic appearance. No distress.   No significant accessory muscle usage on arrival, oxygen 100% on my eval   HENT:   Head: Normocephalic and atraumatic.   Eyes: EOM are normal. Right eye exhibits no discharge. Left eye exhibits no discharge.   Neck:   Normal range of motion.  Cardiovascular:  Normal rate and regular rhythm.           Pulmonary/Chest: No stridor.   Minimal wheezing bilaterally, no accessory muscle usage or tachypnea   Abdominal: Abdomen is soft. There is no abdominal tenderness.   Musculoskeletal:         General: No tenderness or edema.      Cervical back: Normal range  of motion. No rigidity.     Neurological: He is alert. No sensory deficit.   Skin: Skin is warm and dry. No rash noted.   No rash seen on evaluation   Psychiatric: His speech is normal. He is not actively hallucinating.   Not anxious  or agitated       ED Course   Procedures  Labs Reviewed   CBC W/ AUTO DIFFERENTIAL - Abnormal; Notable for the following components:       Result Value    Hemoglobin 12.4 (*)     Hematocrit 38.4 (*)     MCV 77 (*)     MCH 24.7 (*)     RDW 15.6 (*)     All other components within normal limits   COMPREHENSIVE METABOLIC PANEL - Abnormal; Notable for the following components:    Sodium 135 (*)     Glucose 418 (*)     Alkaline Phosphatase 244 (*)     All other components within normal limits   TROPONIN I   B-TYPE NATRIURETIC PEPTIDE   HIV 1 / 2 ANTIBODY   HEPATITIS C ANTIBODY   TROPONIN I   TROPONIN I   POCT GLUCOSE, HAND-HELD DEVICE     EKG Readings: (Independently Interpreted)   EKG interpreted by myself Adryan Whitney DO  Rate 109, sinus tachycardia QRS 76, , no STEMI     Imaging Results    None          Medications   sodium chloride 0.9% flush 10 mL (has no administration in time range)   albuterol-ipratropium 2.5 mg-0.5 mg/3 mL nebulizer solution 3 mL (has no administration in time range)   melatonin tablet 6 mg (has no administration in time range)   ondansetron disintegrating tablet 8 mg (has no administration in time range)   prochlorperazine injection Soln 5 mg (has no administration in time range)   polyethylene glycol packet 17 g (has no administration in time range)   simethicone chewable tablet 80 mg (has no administration in time range)   aluminum-magnesium hydroxide-simethicone 200-200-20 mg/5 mL suspension 30 mL (has no administration in time range)   acetaminophen tablet 650 mg (has no administration in time range)   acetaminophen tablet 1,000 mg (has no administration in time range)   naloxone 0.4 mg/mL injection 0.02 mg (has no administration in time range)    potassium bicarbonate disintegrating tablet 50 mEq (has no administration in time range)   potassium bicarbonate disintegrating tablet 35 mEq (has no administration in time range)   potassium bicarbonate disintegrating tablet 60 mEq (has no administration in time range)   magnesium oxide tablet 800 mg (has no administration in time range)   magnesium oxide tablet 800 mg (has no administration in time range)   potassium, sodium phosphates 280-160-250 mg packet 2 packet (has no administration in time range)   potassium, sodium phosphates 280-160-250 mg packet 2 packet (has no administration in time range)   potassium, sodium phosphates 280-160-250 mg packet 2 packet (has no administration in time range)   insulin aspart U-100 pen 1-10 Units (has no administration in time range)   glucose chewable tablet 16 g (has no administration in time range)   glucose chewable tablet 24 g (has no administration in time range)   glucagon (human recombinant) injection 1 mg (has no administration in time range)   enoxaparin injection 40 mg (has no administration in time range)   insulin detemir U-100 (Levemir) pen 35 Units (has no administration in time range)   insulin aspart U-100 pen 15 Units (has no administration in time range)   sodium chloride 0.9% flush 10 mL (has no administration in time range)     And   sodium chloride 0.9% flush 10 mL (has no administration in time range)   diphenhydrAMINE capsule 25 mg (has no administration in time range)   nitroGLYCERIN SL tablet 0.4 mg (has no administration in time range)   aspirin chewable tablet 81 mg (has no administration in time range)   atorvastatin tablet 20 mg (has no administration in time range)   dextrose 10% bolus 125 mL 125 mL (has no administration in time range)   dextrose 10% bolus 250 mL 250 mL (has no administration in time range)   diphenhydrAMINE injection 50 mg (50 mg Intravenous Given 7/1/23 5436)   methylPREDNISolone sodium succinate injection 125 mg (125 mg  Intravenous Given 7/1/23 0955)   famotidine (PF) injection 40 mg (40 mg Intravenous Given 7/1/23 0955)   aspirin tablet 325 mg (325 mg Oral Given 7/1/23 0955)   ondansetron injection 4 mg (4 mg Intravenous Given 7/1/23 1006)   morphine injection 2 mg (2 mg Intravenous Given 7/1/23 1006)   nitroGLYCERIN 2% TD oint ointment 1 inch (1 inch Topical (Top) Given 7/1/23 1103)   morphine injection 2 mg (2 mg Intravenous Given 7/1/23 1103)     Medical Decision Making:   Initial Assessment:   61-year-old male presents with chest pain and allergic reaction.  Patient reports chest pain and nausea occurred prefer having allergic reaction since then reports some dyspnea.  On evaluation patient had some wheezing.  Patient would have met criteria to receive epinephrine with 2 body system involvement however he declined epinephrine.  Differential Diagnosis:   ACS versus pneumothorax versus pneumonia.  Anaphylaxis versus other allergic reaction  ED Management:  Patient received IV Benadryl, IV Pepcid and IV steroids, declined epinephrine.  -patient also administered morphine, nitro and aspirin for chest pain.  -EKG with no STEMI, patient observed in ED and symptoms improved with interventions.  Given patient's history spoke with hospitalist team will admit for further management evaluation.  Patient updated agrees with plan           ED Course as of 07/01/23 1607   Sat Jul 01, 2023   1044 Patient reassessed, no wheezing, oxygen 100% with no respiratory distress [KB]      ED Course User Index  [KB] Adryan Whitney Jr., DO                 Clinical Impression:   Final diagnoses:  [R07.9] Chest pain (Primary)  [T78.40XA] Allergic reaction, initial encounter        ED Disposition Condition    Observation                 Adryan Whitney Jr.,   07/01/23 1608

## 2023-07-02 LAB
ALBUMIN SERPL BCP-MCNC: 3.2 G/DL (ref 3.5–5.2)
ALP SERPL-CCNC: 126 U/L (ref 55–135)
ALT SERPL W/O P-5'-P-CCNC: 13 U/L (ref 10–44)
AMYLASE SERPL-CCNC: 27 U/L (ref 20–110)
ANION GAP SERPL CALC-SCNC: 9 MMOL/L (ref 8–16)
AST SERPL-CCNC: 11 U/L (ref 10–40)
BASOPHILS # BLD AUTO: 0 K/UL (ref 0–0.2)
BASOPHILS NFR BLD: 0 % (ref 0–1.9)
BILIRUB SERPL-MCNC: 0.6 MG/DL (ref 0.1–1)
BUN SERPL-MCNC: 15 MG/DL (ref 8–23)
CALCIUM SERPL-MCNC: 8.7 MG/DL (ref 8.7–10.5)
CHLORIDE SERPL-SCNC: 102 MMOL/L (ref 95–110)
CO2 SERPL-SCNC: 23 MMOL/L (ref 23–29)
CREAT SERPL-MCNC: 1 MG/DL (ref 0.5–1.4)
DIFFERENTIAL METHOD: ABNORMAL
EOSINOPHIL # BLD AUTO: 0 K/UL (ref 0–0.5)
EOSINOPHIL NFR BLD: 0 % (ref 0–8)
ERYTHROCYTE [DISTWIDTH] IN BLOOD BY AUTOMATED COUNT: 15.7 % (ref 11.5–14.5)
EST. GFR  (NO RACE VARIABLE): >60 ML/MIN/1.73 M^2
GLUCOSE SERPL-MCNC: 247 MG/DL (ref 70–110)
HCT VFR BLD AUTO: 31 % (ref 40–54)
HGB BLD-MCNC: 10.3 G/DL (ref 14–18)
IMM GRANULOCYTES # BLD AUTO: 0.01 K/UL (ref 0–0.04)
IMM GRANULOCYTES NFR BLD AUTO: 0.1 % (ref 0–0.5)
LIPASE SERPL-CCNC: 10 U/L (ref 4–60)
LYMPHOCYTES # BLD AUTO: 1.2 K/UL (ref 1–4.8)
LYMPHOCYTES NFR BLD: 16.1 % (ref 18–48)
MAGNESIUM SERPL-MCNC: 1.9 MG/DL (ref 1.6–2.6)
MCH RBC QN AUTO: 24.8 PG (ref 27–31)
MCHC RBC AUTO-ENTMCNC: 33.2 G/DL (ref 32–36)
MCV RBC AUTO: 75 FL (ref 82–98)
MONOCYTES # BLD AUTO: 0.5 K/UL (ref 0.3–1)
MONOCYTES NFR BLD: 6.8 % (ref 4–15)
NEUTROPHILS # BLD AUTO: 5.9 K/UL (ref 1.8–7.7)
NEUTROPHILS NFR BLD: 77 % (ref 38–73)
NRBC BLD-RTO: 0 /100 WBC
PHOSPHATE SERPL-MCNC: 2.9 MG/DL (ref 2.7–4.5)
PLATELET # BLD AUTO: 301 K/UL (ref 150–450)
PMV BLD AUTO: 11.4 FL (ref 9.2–12.9)
POCT GLUCOSE: 153 MG/DL (ref 70–110)
POCT GLUCOSE: 169 MG/DL (ref 70–110)
POCT GLUCOSE: 225 MG/DL (ref 70–110)
POCT GLUCOSE: 238 MG/DL (ref 70–110)
POCT GLUCOSE: 286 MG/DL (ref 70–110)
POCT GLUCOSE: 301 MG/DL (ref 70–110)
POCT GLUCOSE: 345 MG/DL (ref 70–110)
POTASSIUM SERPL-SCNC: 3.9 MMOL/L (ref 3.5–5.1)
PROT SERPL-MCNC: 6.3 G/DL (ref 6–8.4)
RBC # BLD AUTO: 4.15 M/UL (ref 4.6–6.2)
SODIUM SERPL-SCNC: 134 MMOL/L (ref 136–145)
TROPONIN I SERPL DL<=0.01 NG/ML-MCNC: <0.006 NG/ML (ref 0–0.03)
WBC # BLD AUTO: 7.7 K/UL (ref 3.9–12.7)

## 2023-07-02 PROCEDURE — 93010 EKG 12-LEAD: ICD-10-PCS | Mod: ,,, | Performed by: INTERNAL MEDICINE

## 2023-07-02 PROCEDURE — A4216 STERILE WATER/SALINE, 10 ML: HCPCS | Performed by: HOSPITALIST

## 2023-07-02 PROCEDURE — 93005 ELECTROCARDIOGRAM TRACING: CPT

## 2023-07-02 PROCEDURE — 36415 COLL VENOUS BLD VENIPUNCTURE: CPT | Performed by: INTERNAL MEDICINE

## 2023-07-02 PROCEDURE — 63600175 PHARM REV CODE 636 W HCPCS: Performed by: NURSE PRACTITIONER

## 2023-07-02 PROCEDURE — 25000242 PHARM REV CODE 250 ALT 637 W/ HCPCS: Performed by: NURSE PRACTITIONER

## 2023-07-02 PROCEDURE — 36415 COLL VENOUS BLD VENIPUNCTURE: CPT | Performed by: HOSPITALIST

## 2023-07-02 PROCEDURE — C9113 INJ PANTOPRAZOLE SODIUM, VIA: HCPCS | Performed by: INTERNAL MEDICINE

## 2023-07-02 PROCEDURE — 99900035 HC TECH TIME PER 15 MIN (STAT)

## 2023-07-02 PROCEDURE — 80053 COMPREHEN METABOLIC PANEL: CPT | Performed by: HOSPITALIST

## 2023-07-02 PROCEDURE — 96375 TX/PRO/DX INJ NEW DRUG ADDON: CPT

## 2023-07-02 PROCEDURE — 99222 1ST HOSP IP/OBS MODERATE 55: CPT | Mod: ,,, | Performed by: INTERNAL MEDICINE

## 2023-07-02 PROCEDURE — 85025 COMPLETE CBC W/AUTO DIFF WBC: CPT | Performed by: HOSPITALIST

## 2023-07-02 PROCEDURE — 25000003 PHARM REV CODE 250: Performed by: HOSPITALIST

## 2023-07-02 PROCEDURE — 83690 ASSAY OF LIPASE: CPT | Performed by: INTERNAL MEDICINE

## 2023-07-02 PROCEDURE — 99222 PR INITIAL HOSPITAL CARE,LEVL II: ICD-10-PCS | Mod: ,,, | Performed by: INTERNAL MEDICINE

## 2023-07-02 PROCEDURE — 83735 ASSAY OF MAGNESIUM: CPT | Performed by: HOSPITALIST

## 2023-07-02 PROCEDURE — 84484 ASSAY OF TROPONIN QUANT: CPT | Performed by: HOSPITALIST

## 2023-07-02 PROCEDURE — 93010 ELECTROCARDIOGRAM REPORT: CPT | Mod: ,,, | Performed by: INTERNAL MEDICINE

## 2023-07-02 PROCEDURE — 96372 THER/PROPH/DIAG INJ SC/IM: CPT | Performed by: HOSPITALIST

## 2023-07-02 PROCEDURE — 82962 GLUCOSE BLOOD TEST: CPT

## 2023-07-02 PROCEDURE — 84100 ASSAY OF PHOSPHORUS: CPT | Performed by: HOSPITALIST

## 2023-07-02 PROCEDURE — 96376 TX/PRO/DX INJ SAME DRUG ADON: CPT

## 2023-07-02 PROCEDURE — 94761 N-INVAS EAR/PLS OXIMETRY MLT: CPT

## 2023-07-02 PROCEDURE — G0378 HOSPITAL OBSERVATION PER HR: HCPCS

## 2023-07-02 PROCEDURE — 82150 ASSAY OF AMYLASE: CPT | Performed by: INTERNAL MEDICINE

## 2023-07-02 PROCEDURE — 63600175 PHARM REV CODE 636 W HCPCS: Performed by: INTERNAL MEDICINE

## 2023-07-02 RX ORDER — LIDOCAINE HYDROCHLORIDE 20 MG/ML
15 SOLUTION OROPHARYNGEAL ONCE
Status: DISCONTINUED | OUTPATIENT
Start: 2023-07-02 | End: 2023-07-03 | Stop reason: HOSPADM

## 2023-07-02 RX ORDER — NITROGLYCERIN 0.4 MG/1
0.4 TABLET SUBLINGUAL EVERY 5 MIN PRN
Status: DISCONTINUED | OUTPATIENT
Start: 2023-07-02 | End: 2023-07-03 | Stop reason: HOSPADM

## 2023-07-02 RX ORDER — PANTOPRAZOLE SODIUM 40 MG/1
40 TABLET, DELAYED RELEASE ORAL DAILY
Status: DISCONTINUED | OUTPATIENT
Start: 2023-07-02 | End: 2023-07-03 | Stop reason: HOSPADM

## 2023-07-02 RX ORDER — PANTOPRAZOLE SODIUM 40 MG/10ML
40 INJECTION, POWDER, LYOPHILIZED, FOR SOLUTION INTRAVENOUS ONCE
Status: COMPLETED | OUTPATIENT
Start: 2023-07-02 | End: 2023-07-02

## 2023-07-02 RX ORDER — MORPHINE SULFATE 2 MG/ML
2 INJECTION, SOLUTION INTRAMUSCULAR; INTRAVENOUS ONCE
Status: COMPLETED | OUTPATIENT
Start: 2023-07-02 | End: 2023-07-02

## 2023-07-02 RX ORDER — MAG HYDROX/ALUMINUM HYD/SIMETH 200-200-20
30 SUSPENSION, ORAL (FINAL DOSE FORM) ORAL ONCE
Status: DISCONTINUED | OUTPATIENT
Start: 2023-07-02 | End: 2023-07-03 | Stop reason: HOSPADM

## 2023-07-02 RX ADMIN — MORPHINE SULFATE 2 MG: 2 INJECTION, SOLUTION INTRAMUSCULAR; INTRAVENOUS at 10:07

## 2023-07-02 RX ADMIN — DIPHENHYDRAMINE HYDROCHLORIDE 25 MG: 25 CAPSULE ORAL at 12:07

## 2023-07-02 RX ADMIN — SODIUM CHLORIDE, PRESERVATIVE FREE 10 ML: 5 INJECTION INTRAVENOUS at 06:07

## 2023-07-02 RX ADMIN — INSULIN ASPART 15 UNITS: 100 INJECTION, SOLUTION INTRAVENOUS; SUBCUTANEOUS at 03:07

## 2023-07-02 RX ADMIN — INSULIN ASPART 8 UNITS: 100 INJECTION, SOLUTION INTRAVENOUS; SUBCUTANEOUS at 03:07

## 2023-07-02 RX ADMIN — ATORVASTATIN CALCIUM 20 MG: 20 TABLET, FILM COATED ORAL at 09:07

## 2023-07-02 RX ADMIN — NITROGLYCERIN 0.4 MG: 0.4 TABLET, ORALLY DISINTEGRATING SUBLINGUAL at 09:07

## 2023-07-02 RX ADMIN — INSULIN HUMAN 8 UNITS: 100 INJECTION, SOLUTION PARENTERAL at 12:07

## 2023-07-02 RX ADMIN — INSULIN DETEMIR 35 UNITS: 100 INJECTION, SOLUTION SUBCUTANEOUS at 09:07

## 2023-07-02 RX ADMIN — SODIUM CHLORIDE, PRESERVATIVE FREE 10 ML: 5 INJECTION INTRAVENOUS at 05:07

## 2023-07-02 RX ADMIN — PANTOPRAZOLE SODIUM 40 MG: 40 INJECTION, POWDER, LYOPHILIZED, FOR SOLUTION INTRAVENOUS at 10:07

## 2023-07-02 RX ADMIN — INSULIN ASPART 4 UNITS: 100 INJECTION, SOLUTION INTRAVENOUS; SUBCUTANEOUS at 09:07

## 2023-07-02 NOTE — CONSULTS
[facility]/Surg  Department of Cardiology  Consult Note      PATIENT NAME: Dax Soni    MRN: [id number]  TODAY'S DATE: 07/02/2023  ADMIT DATE: 7/1/2023                          CONSULT REQUESTED BY: Gogo Fonseca MD    SUBJECTIVE     PRINCIPAL PROBLEM: Chest pain      REASON FOR CONSULT:  Chest pain      HPI:  Mr. Soni is a 61-year-old male with history of diabetes mellitus, hypertension, hyperlipidemia, strong family history of coronary artery disease, heart failure.  He had an acute onset of chest pain a heaviness sensation radiating to his neck and the left arm associated with nausea he vomited twice.  He continues to have the episodes of chest pains.  He was given nitroglycerin with no significant improvement.  His EKG has been normal his troponin levels have been normal.  His hemoglobin hematocrit has dropped since admission to the hospital.  His blood sugar has been elevated.        Review of patient's allergies indicates:   Allergen Reactions    Pcn [penicillins]        Past Medical History:   Diagnosis Date    CHF (congestive heart failure)     Coronary artery disease     Diabetes mellitus     Hypertension      History reviewed. No pertinent surgical history.  Social History     Tobacco Use    Smoking status: Never    Smokeless tobacco: Never   Substance Use Topics    Alcohol use: Never    Drug use: Never        REVIEW OF SYSTEMS  CONSTITUTIONAL: Negative for chills, fatigue and fever.   EYES: No double vision, No blurred vision  NEURO: No headaches, No dizziness  RESPIRATORY: Negative for cough, shortness of breath and wheezing.    CARDIOVASCULAR:  Positive for chest pressure   GI: Negative for abdominal pain, No melena, diarrhea.  Complained of nausea and vomiting   : Negative for dysuria and frequency, Negative for hematuria  SKIN: Negative for bruising, Negative for edema or discoloration noted.     OBJECTIVE     VITAL SIGNS (Most Recent)  Temp: 96.8 °F (36 °C) (07/02/23  0711)  Pulse: 82 (07/02/23 0850)  Resp: 18 (07/02/23 0850)  BP: 106/60 (07/02/23 0850)  SpO2: 98 % (07/02/23 0850)    VENTILATION STATUS  Resp: 18 (07/02/23 0850)  SpO2: 98 % (07/02/23 0850)           I & O (Last 24H):  Intake/Output Summary (Last 24 hours) at 7/2/2023 1452  Last data filed at 7/2/2023 1200  Gross per 24 hour   Intake 420 ml   Output 0 ml   Net 420 ml       WEIGHTS  Wt Readings from Last 1 Encounters:   07/02/23 0131 98.8 kg (217 lb 13 oz)   07/01/23 0917 99.8 kg (220 lb)       PHYSICAL EXAM  GENERAL: well built, well nourished, well-developed in no apparent distress alert and oriented.   HEENT: Normocephalic. Pupils normal and conjunctivae normal.  Mucous membranes normal, no cyanosis or icterus, trachea central,no pallor or icterus is noted..   NECK: No JVD. No bruit..   THYROID: Thyroid not enlarged. No nodules present..   CARDIAC: Regular rate and rhythm. S1 is normal.S2 is normal.No gallops, clicks or murmurs noted at this time.  CHEST ANATOMY: normal.   LUNGS: Clear to auscultation. No wheezing or rhonchi..   ABDOMEN: Soft no masses or organomegaly.  No abdomen pulsations or bruits.  Normal bowel sounds. No pulsations and no masses felt, No guarding or rebound.   URINARY: No mckeon catheter   EXTREMITIES: No cyanosis, clubbing or edema noted at this time., no calf tenderness bilaterally.   PERIPHERAL VASCULAR SYSTEM: Good palpable distal pulses.   MUSCLE STRENGTH & TONE: No noteable weakness, atrophy or abnormal movement.     HOME MEDICATIONS:  No current facility-administered medications on file prior to encounter.     No current outpatient medications on file prior to encounter.       SCHEDULED MEDS:   aspirin  81 mg Oral Daily    atorvastatin  20 mg Oral QHS    enoxparin  40 mg Subcutaneous Daily    insulin aspart U-100  15 Units Subcutaneous TIDWM    insulin detemir U-100  35 Units Subcutaneous QHS    polyethylene glycol  17 g Oral Daily    sodium chloride 0.9%  10 mL Intravenous Q6H        CONTINUOUS INFUSIONS:    PRN MEDS:acetaminophen, acetaminophen, albuterol-ipratropium, aluminum-magnesium hydroxide-simethicone, dextrose 10%, dextrose 10%, diphenhydrAMINE, glucagon (human recombinant), glucose, glucose, insulin aspart U-100, magnesium oxide, magnesium oxide, melatonin, naloxone, ondansetron, potassium bicarbonate, potassium bicarbonate, potassium bicarbonate, potassium, sodium phosphates, potassium, sodium phosphates, potassium, sodium phosphates, prochlorperazine, simethicone, sodium chloride 0.9%, Flushing PICC Protocol **AND** sodium chloride 0.9% **AND** sodium chloride 0.9%    LABS AND DIAGNOSTICS     CBC LAST 3 DAYS  Recent Labs   Lab 07/01/23  0950 07/02/23  0329   WBC 5.82 7.70   RBC 5.02 4.15*   HGB 12.4* 10.3*   HCT 38.4* 31.0*   MCV 77* 75*   MCH 24.7* 24.8*   MCHC 32.3 33.2   RDW 15.6* 15.7*    301   MPV 10.9 11.4   GRAN 47.8  2.8 77.0*  5.9   LYMPH 42.8  2.5 16.1*  1.2   MONO 7.6  0.4 6.8  0.5   BASO 0.02 0.00   NRBC 0 0       COAGULATION LAST 3 DAYS  No results for input(s): LABPT, INR, APTT in the last 168 hours.    CHEMISTRY LAST 3 DAYS  Recent Labs   Lab 07/01/23  0950 07/01/23  1939 07/01/23  2334 07/02/23  0329   * 132*  --  134*   K 3.8 4.2  --  3.9    99  --  102   CO2 23 20*  --  23   ANIONGAP 12 13  --  9   BUN 15 15  --  15   CREATININE 1.2 1.4  --  1.0   * 558* 405* 247*   CALCIUM 9.4 8.7  --  8.7   MG  --   --   --  1.9   ALBUMIN 4.1 3.4*  --  3.2*   PROT 7.9 6.5  --  6.3   ALKPHOS 244* 170*  --  126   ALT 15 13  --  13   AST 16 14  --  11   BILITOT 0.4 0.4  --  0.6       CARDIAC PROFILE LAST 3 DAYS  Recent Labs   Lab 07/01/23  0950 07/01/23  1539 07/01/23  1939 07/02/23  0118   BNP 13  --   --   --    TROPONINI <0.006 <0.006 <0.006 <0.006       ENDOCRINE LAST 3 DAYS  No results for input(s): TSH, PROCAL in the last 168 hours.    LAST ARTERIAL BLOOD GAS  ABG  No results for input(s): PH, PO2, PCO2, HCO3, BE in the last 168  hours.    LAST 7 DAYS MICROBIOLOGY   Microbiology Results (last 7 days)       ** No results found for the last 168 hours. **            MOST RECENT IMAGING  X-Ray Chest PA Lateral With Lordotic Vie     CLINICAL:  Cough.     COMPARISON: None available.     FINDINGS:  Cardiopericardial silhouette is upper limits of normal.   Lungs are without dense focal or segmental consolidation, overt  pulmonary edema, pleural effusion or pneumothorax.  Compound  thoracolumbar scoliosis.     IMPRESSION:     NO ACUTE CARDIOPULMONARY PROCESS IDENTIFIED.         Electronically Signed By: Jagdeep Gutierrez MD  Date/Time Signed: 03/12/2017 10:14      ECHOCARDIOGRAM RESULTS (last 5)  No results found for this or any previous visit.      CURRENT/PREVIOUS VISIT EKG  No results found for this or any previous visit.        ASSESSMENT/PLAN:     Active Hospital Problems    Diagnosis    *Chest pain    CHF (congestive heart failure)    Type 2 diabetes mellitus, with long-term current use of insulin    Hyperlipidemia    Hypertension associated with diabetes    CAD (coronary artery disease)       ASSESSMENT & PLAN:   Chest pain with normal EKG and troponin levels.  So far there is no objective data suggestive of myocardial ischemia.  Will obtain an echocardiogram and perform a pharmacological myocardial perfusion scan in a.m.    Uncontrolled diabetes mellitus    Anemia with decreased hemoglobin hematocrit since admission will start him on Protonix    Check amylase and lipase      RECOMMENDATIONS:  Protonix orally  Resume his diet  Obtain a myocardial perfusion scan as well as an echocardiogram in a.m.        Tanner Wilkinson MD  Date of Service: 07/02/2023  2:52 PM

## 2023-07-02 NOTE — NURSING
"Pt noncompliant with care, yelling "Get the fuck, out of my room." Explaining to patient that he is not allowed to leave the floor.  If there is anything I can get for you, then stated again, " Get the Fuck up out of my room!" Will cont to monitor.   "

## 2023-07-02 NOTE — NURSING
Pt refused morning scheduled meds. Reported chest pain radiating to jaw. Refusing nitro oral/patch. EKG ordered and performed, MD notified.

## 2023-07-02 NOTE — PLAN OF CARE
Plan of care reviewed with pt. Pt verbalized understanding. Continuous cardiac monitoring in place as ordered. A-febrile. BG monitored closely, covered per sliding scale orders. Ambulated to bathroom independently. Repositions self independently. Right upper arm midline in place flushing without difficulty, no s/s of infection noted to site, dsg CDI. Complaints of pain and discomfort, PRN pain medication given. Purposeful hourly/q2hr rounding done during shift to promote patient safety. NAD noted. Safety maintained with side rails up x3, bed wheels locked, bed in lowest position, bed alarm set, call light in reach. Patient educated to call for assistance with ambulation if needed, verbalized understanding. Pt remains free of falls. No further needs expressed at this time. Will continue to monitor.     Problem: Adult Inpatient Plan of Care  Goal: Plan of Care Review  Outcome: Ongoing, Progressing  Goal: Patient-Specific Goal (Individualized)  Outcome: Ongoing, Progressing  Goal: Absence of Hospital-Acquired Illness or Injury  Outcome: Ongoing, Progressing  Goal: Optimal Comfort and Wellbeing  Outcome: Ongoing, Progressing  Goal: Readiness for Transition of Care  Outcome: Ongoing, Progressing     Problem: Infection  Goal: Absence of Infection Signs and Symptoms  Outcome: Ongoing, Progressing     Problem: Diabetes Comorbidity  Goal: Blood Glucose Level Within Targeted Range  Outcome: Ongoing, Progressing

## 2023-07-02 NOTE — SUBJECTIVE & OBJECTIVE
Interval History:  Patient seen and examined.  Reports continued chest pain.  Troponins remain negative.  EKG repeated in his unremarkable.  Cardiology to see patient today.  Patient has been rude to staff and declines scheduled medications.    Review of Systems   Cardiovascular:  Positive for chest pain.   Objective:     Vital Signs (Most Recent):  Temp: 96.8 °F (36 °C) (07/02/23 0711)  Pulse: 82 (07/02/23 0850)  Resp: 18 (07/02/23 0850)  BP: 106/60 (07/02/23 0850)  SpO2: 98 % (07/02/23 0850) Vital Signs (24h Range):  Temp:  [96.8 °F (36 °C)-98.2 °F (36.8 °C)] 96.8 °F (36 °C)  Pulse:  [] 82  Resp:  [14-20] 18  SpO2:  [95 %-99 %] 98 %  BP: (103-134)/(60-86) 106/60     Weight: 98.8 kg (217 lb 13 oz)  Body mass index is 34.11 kg/m².    Intake/Output Summary (Last 24 hours) at 7/2/2023 1232  Last data filed at 7/2/2023 0730  Gross per 24 hour   Intake 420 ml   Output 0 ml   Net 420 ml         Physical Exam  Constitutional:       General: He is not in acute distress.     Appearance: He is well-developed.   HENT:      Head: Normocephalic and atraumatic.      Mouth/Throat:      Mouth: Mucous membranes are moist.      Pharynx: Oropharynx is clear.   Eyes:      Pupils: Pupils are equal, round, and reactive to light.   Cardiovascular:      Rate and Rhythm: Normal rate and regular rhythm.      Heart sounds: No murmur heard.  Pulmonary:      Effort: Pulmonary effort is normal. No respiratory distress.      Breath sounds: Normal breath sounds. No wheezing or rales.   Abdominal:      General: Bowel sounds are normal. There is no distension.      Palpations: Abdomen is soft.      Tenderness: There is no abdominal tenderness.   Musculoskeletal:         General: Normal range of motion.   Skin:     General: Skin is warm and dry.      Findings: No rash.   Neurological:      Mental Status: He is alert and oriented to person, place, and time.      Cranial Nerves: No cranial nerve deficit.   Psychiatric:         Behavior: Behavior  normal.           Significant Labs: All pertinent labs within the past 24 hours have been reviewed.    Significant Imaging: I have reviewed all pertinent imaging results/findings within the past 24 hours.

## 2023-07-02 NOTE — NURSING
Nurses Note -- 4 Eyes      7/2/2023   1:45 AM      Skin assessed during: Admit      [x] No Altered Skin Integrity Present    []Prevention Measures Documented      [] Yes- Altered Skin Integrity Present or Discovered   [] LDA Added if Not in Epic (Describe Wound)   [] New Altered Skin Integrity was Present on Admit and Documented in LDA   [] Wound Image Taken    Wound Care Consulted? No    Attending Nurse:  Omer Fernandez RN     Second RN/Staff Member:  Triny Rahman LPN

## 2023-07-02 NOTE — NURSING
"Pt becoming verbally aggressive, throwing items in room, and refusing bed alarm. Continuing complaints of chest pain. Pt refusing oral Tylenol and Zofran. Pt stated, "Discharge me if y'all aren't going to do anything." Offered pt the ability to sign an AMA form, but patient stated, "You sign it." Pt then stated,"Get the f--- out of my room. Y'all are some prejudice white folk." MD and House Supervisor aware.  "

## 2023-07-02 NOTE — PROGRESS NOTES
"Went to bedside to re-evaluate patient as house supervisor and charge nurse made me aware his frustration.  As I walked in patient was refusing oral Zofran stating it does not work.  He reports he tried the oral Zofran last night and it did not help.  Upon review of the MAR he refused the oral Zofran last night and did not try it.  Patient is not vomiting and has not been witnessed vomiting during the hospital stay so I do not see an indication for IV Zofran.  I explained this to him and he was rude and called me "prejudice" and "stupid".    We will await Cardiology consult  "

## 2023-07-02 NOTE — PROGRESS NOTES
[facility] Medicine  Progress Note    Patient Name: Dax Soni  MRN: [id number]  Patient Class: OP- Observation   Admission Date: 7/1/2023  Length of Stay: 0 days  Attending Physician: Gogo Fonseca MD  Primary Care Provider: No primary care provider on file.        Subjective:     Principal Problem:Chest pain        HPI:  Patient is a 61-year-old male with self-reported history of CHF, CAD status post 4 stents, last in 2017 in [place], diabetes, hypertension, hyperlipidemia who was seen today in the ED for chest pain and concern for allergic reaction.  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  He states that he started to have chest pain around 8:00 a.m. today.  It was associated with diaphoresis, nausea, vomiting.  He says he feels like it as an elephant on his chest and radiates up to his jaw down to his arm and is a 9/10 pain.  He reports the pain has been constant since this morning.  He reports not having any pain like this since 2017 when he had his last stent.  He reports he took 3 nitroglycerin and 4 baby aspirins and went to a Bahai breakfast thinking the pain would go away.  He ate something that had onion and tomato when it which he reports he is allergic to and began to have an allergic reaction with throat itching and reported swelling and presented to the ED for evaluation.  He was given Benadryl, steroids, Pepcid in the ED for the allergic reaction.  The ED physician offered to give him epinephrine but patient declined.  He has been given morphine for the chest pain.  He reports he is still having chest pain.  EKG and troponin negative.    Chart reviewed and patient has multiple different MRNs with slightly different addresses and birthdays.  He has presented with a similar story like this multiple times.  He had an angiogram in 2018 which showed no stents and clean coronaries.  He has left AMA multiple times from the hospital after not getting IV pain  medications.    We will place the patient on observation and monitor him closely.  He will undergo cardiac workup for his chest pain.  Cardiology will be consulted.  Other MRNs  [id number]  [id number]  [id number]  [id number]  [id number] - this has the 2018 cath report - no CAD, no stents  [id number]  [id number]  [id number]  [id number]  [id number]           Overview/Hospital Course:  No notes on file    Interval History:  Patient seen and examined.  Reports continued chest pain.  Troponins remain negative.  EKG repeated in his unremarkable.  Cardiology to see patient today.  Patient has been rude to staff and declines scheduled medications.    Review of Systems   Cardiovascular:  Positive for chest pain.   Objective:     Vital Signs (Most Recent):  Temp: 96.8 °F (36 °C) (07/02/23 0711)  Pulse: 82 (07/02/23 0850)  Resp: 18 (07/02/23 0850)  BP: 106/60 (07/02/23 0850)  SpO2: 98 % (07/02/23 0850) Vital Signs (24h Range):  Temp:  [96.8 °F (36 °C)-98.2 °F (36.8 °C)] 96.8 °F (36 °C)  Pulse:  [] 82  Resp:  [14-20] 18  SpO2:  [95 %-99 %] 98 %  BP: (103-134)/(60-86) 106/60     Weight: 98.8 kg (217 lb 13 oz)  Body mass index is 34.11 kg/m².    Intake/Output Summary (Last 24 hours) at 7/2/2023 1232  Last data filed at 7/2/2023 0730  Gross per 24 hour   Intake 420 ml   Output 0 ml   Net 420 ml         Physical Exam  Constitutional:       General: He is not in acute distress.     Appearance: He is well-developed.   HENT:      Head: Normocephalic and atraumatic.      Mouth/Throat:      Mouth: Mucous membranes are moist.      Pharynx: Oropharynx is clear.   Eyes:      Pupils: Pupils are equal, round, and reactive to light.   Cardiovascular:      Rate and Rhythm: Normal rate and regular rhythm.      Heart sounds: No murmur heard.  Pulmonary:      Effort: Pulmonary effort is normal. No respiratory distress.      Breath sounds: Normal breath sounds. No wheezing or rales.   Abdominal:      General: Bowel sounds are normal. There is no distension.       Palpations: Abdomen is soft.      Tenderness: There is no abdominal tenderness.   Musculoskeletal:         General: Normal range of motion.   Skin:     General: Skin is warm and dry.      Findings: No rash.   Neurological:      Mental Status: He is alert and oriented to person, place, and time.      Cranial Nerves: No cranial nerve deficit.   Psychiatric:         Behavior: Behavior normal.           Significant Labs: All pertinent labs within the past 24 hours have been reviewed.    Significant Imaging: I have reviewed all pertinent imaging results/findings within the past 24 hours.      Assessment/Plan:      * Chest pain  Troponins remain negative.  EKG remains unremarkable  Telemetry monitoring  Cardiology consult        CAD (coronary artery disease)  Patient reports 4 stents, last placed in 2017  No obstructive CAD and no stents seen on cath in 2018 per chart review  Telemetry monitoring        Hypertension associated with diabetes  Patient reports being on Lisinopril, metoprolol, imdur, lasix    BP low/normal at this time  Monitor BP, introduce medications as needed    Hyperlipidemia   Patient is chronically on statin.will continue for now. Monitor clinically. Last LDL was No results found for: LDLCALC         Type 2 diabetes mellitus, with long-term current use of insulin  Patient's FSGs are uncontrolled due to hyperglycemia on current medication regimen.  Last A1c reviewed- No results found for: LABA1C, HGBA1C  Most recent fingerstick glucose reviewed- No results for input(s): POCTGLUCOSE in the last 24 hours.  Current correctional scale  Medium  Increase anti-hyperglycemic dose as follows-   Antihyperglycemics (From admission, onward)    None        Hold Oral hypoglycemics while patient is in the hospital.    CHF (congestive heart failure)  Patient reports h/o CAD  BNP wnl  Monitor volume status  Recent Labs   Lab 07/01/23  0950   BNP 13   .      VTE Risk Mitigation (From admission, onward)         Ordered      enoxaparin injection 40 mg  Daily         07/01/23 1537     IP VTE HIGH RISK PATIENT  Once         07/01/23 1537     Place sequential compression device  Until discontinued         07/01/23 1537                Discharge Planning   RAJAT:      Code Status: Full Code   Is the patient medically ready for discharge?:     Reason for patient still in hospital (select all that apply): Patient trending condition, Treatment and Consult recommendations  Discharge Plan A: Home                  Gogo Fonseca MD  Department of Hospital Medicine   [facility]/Surg

## 2023-07-02 NOTE — NURSING
"Offered pt oral Zofran again, pt refused stating, "The oral doesn't work. Get me IV." MD was present to witness refusal of med.  "

## 2023-07-02 NOTE — PLAN OF CARE
POC/Meds reviewed, pt verbalized understanding. Vitals stable. Afebrile. Remains on room air. Tele In place-NSR. Up with x1 assist to bathroom. IS at bedside, instructed on use and return demonstration performed. Pt refusing most care. BG monitored, coverage provided. Repositions self. Hourly/Q2hr rounding performed, safety maintained. Bed in lowest position, wheels locked, SR up x2, call light in easy reach. No complaints at this time. Will continue to monitor.

## 2023-07-02 NOTE — CARE UPDATE
"Called by nurse to come speak to patient as he is complaining of chest pain. His 3rd troponin is again negative. He has NTG to his chest.    Explained that his chest pain is likely noncardiac given 3 sets of negative troponins. Extensive chart review of over 10 charts with several different years of birth and names, but same [birth year] date. I personally admitted him in 2020 and there is a picture of him on that chart with a different birth year. Per this extensive chart review, he's had multiple negative stress tests and angiograms with clear coronaries and no stents that he previously claimed he had. He states I'm saying my chest his hurting. Offered him tylenol. He states he isn't going to take it. Explained there is no indication for opioids at this time. He states, "I didn't ask for them, see there you go making assumptions." Clarified that he was offered tylenol but declined which is a non opioid pain reliever. He said, "whatever".     He asked why he hasn't gotten transferred up to a room and explained it was d/t his significantly elevated sugar and we had to get that down or place him on an insulin gtt and put him on the step down unit. Explained that he had an elevated sugar earlier and proceeded to eat oreos. He states his sugar was high because he was given steroids. Explained that he was given the steroids because he reported eating one his "allergens". He states he's being treated differently because "I'm black".     He was last admitted that I can see 4/2022 at [facility] and had a cardiologist evaluation but left AMA prior to stress test. His previous stress test in 2021 was negative. Please see MRN [id number] for more information and charting on each encounter with demanding drug seeking behavior and leaving AMA.       After leaving the room, he yelled at me to come back in and requested something for nausea explained he had zofran that he refused. He states that doesn't work. I have ordered a 1 time does of " reglan.

## 2023-07-02 NOTE — NURSING
"Pt specifically requesting IV benadryl only. Explained he only has PO medication ordered. Pt responded, "well that dont do anything for me, it dont help can you ask them to give me the IV that's all I want?" Adelita Min, NP notified of pt request. NP stated only PO medications are to be given and no additional orders given.    "

## 2023-07-02 NOTE — ED NOTES
Pt c/o pain, Tylenol offered, pt says he's allergic to it. Tylenol is not of the allergy list. Hospitalist, Felicity HADDAD Notified of pt request. No orders given. Pt advised.

## 2023-07-02 NOTE — ASSESSMENT & PLAN NOTE
Troponins remain negative.  EKG remains unremarkable  Telemetry monitoring  Cardiology consult

## 2023-07-03 VITALS
BODY MASS INDEX: 34.19 KG/M2 | OXYGEN SATURATION: 97 % | SYSTOLIC BLOOD PRESSURE: 123 MMHG | RESPIRATION RATE: 18 BRPM | TEMPERATURE: 98 F | DIASTOLIC BLOOD PRESSURE: 78 MMHG | HEIGHT: 67 IN | WEIGHT: 217.81 LBS | HEART RATE: 78 BPM

## 2023-07-03 LAB
ALBUMIN SERPL BCP-MCNC: 2.9 G/DL (ref 3.5–5.2)
ALP SERPL-CCNC: 107 U/L (ref 55–135)
ALT SERPL W/O P-5'-P-CCNC: 14 U/L (ref 10–44)
ANION GAP SERPL CALC-SCNC: 9 MMOL/L (ref 8–16)
AST SERPL-CCNC: 12 U/L (ref 10–40)
BASOPHILS # BLD AUTO: 0.03 K/UL (ref 0–0.2)
BASOPHILS NFR BLD: 0.4 % (ref 0–1.9)
BILIRUB SERPL-MCNC: 0.4 MG/DL (ref 0.1–1)
BUN SERPL-MCNC: 17 MG/DL (ref 8–23)
CALCIUM SERPL-MCNC: 8.3 MG/DL (ref 8.7–10.5)
CHLORIDE SERPL-SCNC: 102 MMOL/L (ref 95–110)
CO2 SERPL-SCNC: 24 MMOL/L (ref 23–29)
CREAT SERPL-MCNC: 1 MG/DL (ref 0.5–1.4)
DIFFERENTIAL METHOD: ABNORMAL
EOSINOPHIL # BLD AUTO: 0.1 K/UL (ref 0–0.5)
EOSINOPHIL NFR BLD: 1.3 % (ref 0–8)
ERYTHROCYTE [DISTWIDTH] IN BLOOD BY AUTOMATED COUNT: 15.8 % (ref 11.5–14.5)
EST. GFR  (NO RACE VARIABLE): >60 ML/MIN/1.73 M^2
FERRITIN SERPL-MCNC: 17 NG/ML (ref 20–300)
GLUCOSE SERPL-MCNC: 291 MG/DL (ref 70–110)
HCT VFR BLD AUTO: 29.6 % (ref 40–54)
HGB BLD-MCNC: 9.9 G/DL (ref 14–18)
IMM GRANULOCYTES # BLD AUTO: 0.02 K/UL (ref 0–0.04)
IMM GRANULOCYTES NFR BLD AUTO: 0.3 % (ref 0–0.5)
IRON SERPL-MCNC: 22 UG/DL (ref 45–160)
LYMPHOCYTES # BLD AUTO: 3.6 K/UL (ref 1–4.8)
LYMPHOCYTES NFR BLD: 45.7 % (ref 18–48)
MAGNESIUM SERPL-MCNC: 1.8 MG/DL (ref 1.6–2.6)
MCH RBC QN AUTO: 25.3 PG (ref 27–31)
MCHC RBC AUTO-ENTMCNC: 33.4 G/DL (ref 32–36)
MCV RBC AUTO: 76 FL (ref 82–98)
MONOCYTES # BLD AUTO: 0.5 K/UL (ref 0.3–1)
MONOCYTES NFR BLD: 6.9 % (ref 4–15)
NEUTROPHILS # BLD AUTO: 3.6 K/UL (ref 1.8–7.7)
NEUTROPHILS NFR BLD: 45.4 % (ref 38–73)
NRBC BLD-RTO: 0 /100 WBC
PHOSPHATE SERPL-MCNC: 3.8 MG/DL (ref 2.7–4.5)
PLATELET # BLD AUTO: 273 K/UL (ref 150–450)
PMV BLD AUTO: 11.6 FL (ref 9.2–12.9)
POCT GLUCOSE: 204 MG/DL (ref 70–110)
POTASSIUM SERPL-SCNC: 3.6 MMOL/L (ref 3.5–5.1)
PROT SERPL-MCNC: 5.7 G/DL (ref 6–8.4)
RBC # BLD AUTO: 3.91 M/UL (ref 4.6–6.2)
SATURATED IRON: 7 % (ref 20–50)
SODIUM SERPL-SCNC: 135 MMOL/L (ref 136–145)
TOTAL IRON BINDING CAPACITY: 335 UG/DL (ref 250–450)
TRANSFERRIN SERPL-MCNC: 239 MG/DL (ref 200–375)
TROPONIN I SERPL DL<=0.01 NG/ML-MCNC: 0.01 NG/ML (ref 0–0.03)
WBC # BLD AUTO: 7.81 K/UL (ref 3.9–12.7)

## 2023-07-03 PROCEDURE — 82728 ASSAY OF FERRITIN: CPT | Performed by: INTERNAL MEDICINE

## 2023-07-03 PROCEDURE — 80053 COMPREHEN METABOLIC PANEL: CPT | Performed by: HOSPITALIST

## 2023-07-03 PROCEDURE — 83735 ASSAY OF MAGNESIUM: CPT | Performed by: HOSPITALIST

## 2023-07-03 PROCEDURE — G0378 HOSPITAL OBSERVATION PER HR: HCPCS

## 2023-07-03 PROCEDURE — 84484 ASSAY OF TROPONIN QUANT: CPT | Performed by: INTERNAL MEDICINE

## 2023-07-03 PROCEDURE — 94761 N-INVAS EAR/PLS OXIMETRY MLT: CPT

## 2023-07-03 PROCEDURE — 25000003 PHARM REV CODE 250: Performed by: HOSPITALIST

## 2023-07-03 PROCEDURE — 36415 COLL VENOUS BLD VENIPUNCTURE: CPT | Performed by: HOSPITALIST

## 2023-07-03 PROCEDURE — 84466 ASSAY OF TRANSFERRIN: CPT | Performed by: INTERNAL MEDICINE

## 2023-07-03 PROCEDURE — 84100 ASSAY OF PHOSPHORUS: CPT | Performed by: HOSPITALIST

## 2023-07-03 PROCEDURE — 85025 COMPLETE CBC W/AUTO DIFF WBC: CPT | Performed by: HOSPITALIST

## 2023-07-03 PROCEDURE — 99900035 HC TECH TIME PER 15 MIN (STAT)

## 2023-07-03 RX ADMIN — DIPHENHYDRAMINE HYDROCHLORIDE 25 MG: 25 CAPSULE ORAL at 12:07

## 2023-07-03 NOTE — PLAN OF CARE
Pt left AMA     07/03/23 0908   Final Note   Assessment Type Final Discharge Note   Anticipated Discharge Disposition Left Against

## 2023-07-03 NOTE — PROGRESS NOTES
"Pt refused echo. Stated that it would just make his chest hurt more. I said may I explain what the echo is and maybe change your mind.  Pt stated "I know what the stupid test is and I ain't doing it! I ain't doing the stress test either. Now get my nurse so I can sign myself out"  He then pushed his call light for the nurse.   I again clarified that he was refusing the echo.  He again stated "I am leaving. This is all ridiculous"  Relayed this to the nurse and sent a message to Dr Wilkinson.  "

## 2023-07-03 NOTE — NURSING
"RRT alarm for room 317.  Upon entering room patient seen reaching back near alarm buttons.  Asked pt if he was okay.  He stated "Yes".  Asked pt why he pushed RRT button, Pt stated "I didn't do that".    Pt requesting another EKG and nitro at this time.  MODESTO Lake NP messaged and order obtained for nitro.  Prn EKG in process now.  Nitro brought to pt.  Attempted to explain nitro/side effects and how it is dosed.  Pt kept interrupting and repeating "I know that", then stated "You all think black people are stupid".  "

## 2023-07-03 NOTE — NURSING
Messaged EKG results to MODESTO Lake NP.  Notified Dr Perez of pt complaints and new EKG results.  See orders.  Pt refusing GI cocktail.  IV Protonix and Morphine given.

## 2023-07-03 NOTE — NURSING
"Pt refusing 4AM VS.      Pt called phlebotomist a "purple haired punk rocker" while yelling at her to mind her own business.    Pt irate that first 10ml of blood drawn from midline had to be discarded.  "

## 2023-07-03 NOTE — NURSING
"Pt offered Tylenol for complaint of chest pain.  Pt refused stating "I told y'all I'm allergic to that".  "

## 2023-07-03 NOTE — NURSING
"Pt states chest pain the same as before "10 of 10" "feels like an elephant is sitting on my chest".  Originally pt stated Tylenol "breaks him out".  Offered to premedicate pt with Benadryl prior to giving Tylenol.  Pt refusing because the Benadryl is not IV.  Pt now states that Tylenol causes him to "itch, get hives and not breathe".  MODESTO Lake NP notified.    Asked pt if he was done with dinner tray.  Pt stated "yes".  After dinner tray removed pt irate and yelling that the partially eaten food on the tray was removed with the tray and not just the actual tray. Offered pt more food, pt continued to yell and not answer.  "

## 2023-07-03 NOTE — NURSING
Pt complaining of chest pain at shift change. Night shift hospitalist aware. Nitroglycerine offered and refused. Pt also refused PRN Tylenol.

## 2023-07-03 NOTE — NURSING
Spoke with Dr. Wilkinson and Dr. Gomez about pt.  Dr. Wilkinson asked nurse to explain to pt that he thinks he has pericarditis and pt needs to stay to be treated, but if pt still refuses then to have Dr. Gomez call him about the pt.  Pt took out his own Midline to R arm and pulled off his heart monitor.  Pt refused to sign AMA paperwork.  Notified LOLITA Lay and MD Dr. Gomez.  Pt left facility AMA.

## 2023-07-03 NOTE — NURSING
"Attempted to explained NPO after midnight to pt.  Pt stated "I don't need you to explain that to me, I'm not a child".  "

## 2023-07-03 NOTE — PLAN OF CARE
Problem: Adult Inpatient Plan of Care  Goal: Plan of Care Review  Outcome: Met  Goal: Patient-Specific Goal (Individualized)  Outcome: Met  Goal: Absence of Hospital-Acquired Illness or Injury  Outcome: Met  Goal: Optimal Comfort and Wellbeing  Outcome: Met  Goal: Readiness for Transition of Care  Outcome: Met     Problem: Infection  Goal: Absence of Infection Signs and Symptoms  Outcome: Met     Problem: Diabetes Comorbidity  Goal: Blood Glucose Level Within Targeted Range  Outcome: Met   Pt refused all care this am and left facility AMA without signing paperwork.  MD and CN notified.

## 2023-07-03 NOTE — NURSING
Pt refused insulin coverage this am.  Pt also refused stress test this am, and was given education about refusing stress test d/t chest pain.  Pt claims that he is leaving.  AMA paperwork has already been done and pt will sign.  MD notified.  Will continue to monitor.

## 2023-07-06 ENCOUNTER — HOSPITAL ENCOUNTER (EMERGENCY)
Facility: HOSPITAL | Age: 63
Discharge: HOME | End: 2023-07-06
Attending: EMERGENCY MEDICINE

## 2023-07-06 VITALS
HEART RATE: 99 BPM | RESPIRATION RATE: 19 BRPM | BODY MASS INDEX: 34.53 KG/M2 | SYSTOLIC BLOOD PRESSURE: 138 MMHG | TEMPERATURE: 98 F | WEIGHT: 220 LBS | HEIGHT: 67 IN | DIASTOLIC BLOOD PRESSURE: 92 MMHG | OXYGEN SATURATION: 98 %

## 2023-07-06 DIAGNOSIS — R73.9 HYPERGLYCEMIA: Primary | ICD-10-CM

## 2023-07-06 DIAGNOSIS — R07.9 CHEST PAIN: ICD-10-CM

## 2023-07-06 LAB
ANION GAP SERPL CALC-SCNC: 9 MMOL/L (ref 8–16)
BASOPHILS # BLD AUTO: 0.03 K/UL (ref 0–0.2)
BASOPHILS NFR BLD: 0.5 % (ref 0–1.9)
BUN SERPL-MCNC: 15 MG/DL (ref 8–23)
CALCIUM SERPL-MCNC: 9.3 MG/DL (ref 8.7–10.5)
CHLORIDE SERPL-SCNC: 94 MMOL/L (ref 95–110)
CO2 SERPL-SCNC: 26 MMOL/L (ref 23–29)
CREAT SERPL-MCNC: 0.9 MG/DL (ref 0.5–1.4)
DIFFERENTIAL METHOD: ABNORMAL
EOSINOPHIL # BLD AUTO: 0.2 K/UL (ref 0–0.5)
EOSINOPHIL NFR BLD: 3.3 % (ref 0–8)
ERYTHROCYTE [DISTWIDTH] IN BLOOD BY AUTOMATED COUNT: 15.9 % (ref 11.5–14.5)
EST. GFR  (NO RACE VARIABLE): >60 ML/MIN/1.73 M^2
GLUCOSE SERPL-MCNC: 445 MG/DL (ref 70–110)
GLUCOSE SERPL-MCNC: 471 MG/DL (ref 70–110)
HCT VFR BLD AUTO: 34.4 % (ref 40–54)
HGB BLD-MCNC: 11.4 G/DL (ref 14–18)
HIV 1+2 AB+HIV1 P24 AG SERPL QL IA: NEGATIVE
IMM GRANULOCYTES # BLD AUTO: 0.02 K/UL (ref 0–0.04)
IMM GRANULOCYTES NFR BLD AUTO: 0.3 % (ref 0–0.5)
LYMPHOCYTES # BLD AUTO: 2.3 K/UL (ref 1–4.8)
LYMPHOCYTES NFR BLD: 36.2 % (ref 18–48)
MCH RBC QN AUTO: 24.9 PG (ref 27–31)
MCHC RBC AUTO-ENTMCNC: 33.1 G/DL (ref 32–36)
MCV RBC AUTO: 75 FL (ref 82–98)
MONOCYTES # BLD AUTO: 0.7 K/UL (ref 0.3–1)
MONOCYTES NFR BLD: 10.3 % (ref 4–15)
NEUTROPHILS # BLD AUTO: 3.1 K/UL (ref 1.8–7.7)
NEUTROPHILS NFR BLD: 49.4 % (ref 38–73)
NRBC BLD-RTO: 0 /100 WBC
PLATELET # BLD AUTO: 333 K/UL (ref 150–450)
PMV BLD AUTO: 11.4 FL (ref 9.2–12.9)
POTASSIUM SERPL-SCNC: 3.8 MMOL/L (ref 3.5–5.1)
RBC # BLD AUTO: 4.57 M/UL (ref 4.6–6.2)
SODIUM SERPL-SCNC: 129 MMOL/L (ref 136–145)
TROPONIN I SERPL HS-MCNC: 3.9 PG/ML (ref 0–14.9)
WBC # BLD AUTO: 6.29 K/UL (ref 3.9–12.7)

## 2023-07-06 PROCEDURE — 85025 COMPLETE CBC W/AUTO DIFF WBC: CPT | Performed by: EMERGENCY MEDICINE

## 2023-07-06 PROCEDURE — 99281 EMR DPT VST MAYX REQ PHY/QHP: CPT | Mod: 25

## 2023-07-06 PROCEDURE — 93005 ELECTROCARDIOGRAM TRACING: CPT | Performed by: INTERNAL MEDICINE

## 2023-07-06 PROCEDURE — 80048 BASIC METABOLIC PNL TOTAL CA: CPT | Performed by: EMERGENCY MEDICINE

## 2023-07-06 PROCEDURE — 93010 EKG 12-LEAD: ICD-10-PCS | Mod: ,,, | Performed by: INTERNAL MEDICINE

## 2023-07-06 PROCEDURE — 93010 ELECTROCARDIOGRAM REPORT: CPT | Mod: ,,, | Performed by: INTERNAL MEDICINE

## 2023-07-06 PROCEDURE — 82962 GLUCOSE BLOOD TEST: CPT

## 2023-07-06 PROCEDURE — 99284 EMERGENCY DEPT VISIT MOD MDM: CPT

## 2023-07-06 PROCEDURE — 84484 ASSAY OF TROPONIN QUANT: CPT | Performed by: EMERGENCY MEDICINE

## 2023-07-06 NOTE — ED PROVIDER NOTES
Chief complaint:  Hyperglycemia (Pt states out of meds x few days due to traveling. Cbg high at home. Denies pain. No vomiting.)      HPI:  Dax Soni is a 61 y.o. male with hx IDDM, CAD s/p stents (last 2017), CHF presenting with complaints of hyperglycemia and chest pain. Recent d/c AMA 3 days prior for workup of CP with nonischemic EKG and serial negative troponin evaluation prior to departure.  Patient endorses blood sugar levels to the 120s to 130s at home but states he has been out of his insulin for 2 days.  He does endorse nonbilious, nonbloody emesis this morning along with 1-2 loose, nonbloody stools.  No associated abdominal pain.  He adds he has had relatively continuous chest pain on the left side without radiation or migration on accompanied by diaphoresis, back pain, dyspnea.  It is not modified by position or exertion.  It has stayed relatively similar since recent discharge from the hospital.    ROS: As per HPI and below:  No headache, confusion, syncope, hemoptysis, abdominal pain, blood in the stools, rashes, swelling, fever, focal numbness or weakness.    Review of patient's allergies indicates:   Allergen Reactions    Iodine     Pcn [penicillins]        Patient's Medications    No medications on file       PMH:  As per HPI and below:  Past Medical History:   Diagnosis Date    CHF (congestive heart failure)     Coronary artery disease     Diabetes mellitus     Hypertension      No past surgical history on file.    Social History     Socioeconomic History    Marital status:    Tobacco Use    Smoking status: Never    Smokeless tobacco: Never   Substance and Sexual Activity    Alcohol use: Never    Drug use: Never     Social Determinants of Health     Financial Resource Strain: Unknown    Difficulty of Paying Living Expenses: Patient refused   Food Insecurity: Unknown    Worried About Running Out of Food in the Last Year: Patient refused    Ran Out of Food in the Last Year: Patient  refused   Transportation Needs: Unknown    Lack of Transportation (Medical): Patient refused    Lack of Transportation (Non-Medical): Patient refused   Physical Activity: Unknown    Days of Exercise per Week: Patient refused    Minutes of Exercise per Session: Patient refused   Stress: Unknown    Feeling of Stress : Patient refused   Social Connections: Unknown    Frequency of Communication with Friends and Family: Patient refused    Frequency of Social Gatherings with Friends and Family: Patient refused    Attends Jehovah's witness Services: Patient refused    Active Member of Clubs or Organizations: Patient refused    Attends Club or Organization Meetings: Patient refused    Marital Status: Patient refused   Housing Stability: Unknown    Unable to Pay for Housing in the Last Year: Patient refused    Unstable Housing in the Last Year: Patient refused       Family History   Problem Relation Age of Onset    Diabetes Mother     Hypertension Mother     Diabetes Father     Hypertension Father        Physical Exam:    Vitals:    07/06/23 0845   BP: (!) 138/92   Pulse: 99   Resp: 19   Temp: 97.9 °F (36.6 °C)     GENERAL:  No apparent distress.  Alert.    HEENT:  Moist mucous membranes.  Normocephalic and atraumatic.    NECK:  No swelling.  Midline trachea.   CARDIOVASCULAR:  Regular rate and rhythm.  2+ radial pulses.  No murmur auscultated.  PULMONARY:  Lungs clear to auscultation bilaterally.  No wheezes, rales, or rhonci.  Unlabored respirations.  ABDOMEN:  Non-tender and non-distended.    EXTREMITIES:  Warm and well perfused.  Brisk capillary refill.  2+ DP and PT pulses.  No peripheral edema.  Legs are symmetric and nontender to palpation.  NEUROLOGICAL:  Normal mental status.  Appropriate and conversant.    SKIN:  No rashes or ecchymoses.    BACK:  Atraumatic.  No CVA tenderness to palpation.        Labs Reviewed   CBC W/ AUTO DIFFERENTIAL - Abnormal; Notable for the following components:       Result Value    RBC 4.57 (*)      Hemoglobin 11.4 (*)     Hematocrit 34.4 (*)     MCV 75 (*)     MCH 24.9 (*)     RDW 15.9 (*)     All other components within normal limits   POCT GLUCOSE - Abnormal; Notable for the following components:    POC Glucose 471 (*)     All other components within normal limits   BASIC METABOLIC PANEL   TROPONIN I HIGH SENSITIVITY       There are no discharge medications for this patient.      Orders Placed This Encounter   Procedures    X-Ray Chest 1 View    CBC auto differential    Basic metabolic panel    Troponin I High Sensitivity    EKG 12-lead    Insert Saline lock IV       Imaging Results              X-Ray Chest 1 View (Final result)  Result time 07/06/23 09:33:34      Final result by Jin Farias MD (07/06/23 09:33:34)                   Narrative:    HISTORY: Chest pain and hyperglycemia.    FINDINGS: Portable chest radiograph at 0922 hours compared to prior exams shows the cardiomediastinal silhouette and pulmonary vasculature are within normal limits.    The lungs are normally expanded, with no consolidation, large pleural effusion, or evidence of pulmonary edema. No confluent infiltrates or pneumothorax. There are no significant osseous abnormalities.    IMPRESSION: No evidence of active cardiopulmonary disease.    Electronically signed by:  Jin Farias MD  7/6/2023 9:33 AM CDT Workstation: [id number]                                    ED Course as of 07/06/23 1037   Thu Jul 06, 2023   0854 EKG:  NSR with sinus arrhythmia, rate of 83, normal intervals and axis.  Early repolarization pattern similar to last prior. There are no acute ST or T wave changes suggestive of acute ischemia or infarction.  (Independently interpreted by me)   [MR]   0935 CXR:  NAD compared to prior. (Independently interpreted by me)   [MR]   0947 D/w Dr. Wilkinson cardiology who had low suspicion for ischemia in previous workup with ? Pericarditis.  Echo had been planned but could be done as outpatient.  If workup remains  negative with cardiac biomarkers negative, no need to admit for monitoring and stress.  This  could be done on close f/u as well with consideration of indomethacin and colchicine for pericarditis.   [MR]   0956 Bedside ultrasound shows no significant pericardial effusion on assessment. [MR]      ED Course User Index  [MR] Clint Curiel MD       MDM:    61 y.o. male with complaints of hyperglycemia in the setting of insulin noncompliance as well as relatively unchanged chest pain since recent discharge with previous negative workup.  Repeat EKG shows no acute finding including no sign of ischemic change.  Cardiac biomarker sent for further risk stratification.  I have very low suspicion for life-threatening intrathoracic process such as aortic dissection or PE.  I do not think CT of the chest is indicated.  In regard to hyperglycemia patient given IV fluids with laboratory sent to exclude DKA or other associated electrolyte derangement or renal insult.  Obvious trigger of medication noncompliance here.  Low suspicion for other life-threatening precipitating cause.    Advised patient that they need to remain for further observation and completion of medical workup.  Patient refuses to remain.  Patient is alert and oriented to person, place, and situation.  I explained the risks of worsening condition possibly leading to death or permanent disability in layman's terms to the patient.  Patient voices these risks back to me.   Patient is not altered and is not under the influence.  Patient is welcome to return to the hospital at any time if he chooses to do so.  I will discharge the patient AGAINST MEDICAL ADVICE.    Patient would not remain for me to provide discharge instructions although I was able to review verbal return precautions and discuss cardiology follow-up.    Diagnoses:    1. Chest pain  2. Hyperglycemia       Clint Curiel MD  07/06/23 1038

## 2023-07-06 NOTE — ED NOTES
Pt stated he wanted to leave, called Dr. Curiel to room. He counseled pt, pt stated he was going to stay. As soon as  Left the room he said he was going to take the iv out. He didn't want anyone to touch him then he let the tech take it out and left the room. Md aware.

## 2023-07-07 LAB — HCV IGG SERPL QL IA: NEGATIVE

## 2023-08-09 NOTE — HOSPITAL COURSE
Observed by the hospital medicine service for chest pain.  Cardiology was consulted.  Stress test and echocardiogram were performed.  Patient refused these tests.  Patient continued to complain of pain.  Was was aggressive toward staff.  He chose to leave AMA and forgo further testing

## 2023-08-09 NOTE — DISCHARGE SUMMARY
[facility]  Discharge Summary      Patient Name: Dax Soni  MRN: [id number]  [facility]: [id number]  Patient Class: OP- Observation  Admission Date: 7/1/2023  Hospital Length of Stay: 0 days  Discharge Date and Time: 7/3/2023  8:42 AM  Attending Physician: Tiffany att. providers found   Discharging Provider: Gogo Fonseca MD  Primary Care Provider: Tiffany, Primary Doctor    Primary Care Team: Networked reference to record PCT     HPI:   Patient is a 61-year-old male with self-reported history of CHF, CAD status post 4 stents, last in 2017 in [place], diabetes, hypertension, hyperlipidemia who was seen today in the ED for chest pain and concern for allergic reaction.  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  He states that he started to have chest pain around 8:00 a.m. today.  It was associated with diaphoresis, nausea, vomiting.  He says he feels like it as an elephant on his chest and radiates up to his jaw down to his arm and is a 9/10 pain.  He reports the pain has been constant since this morning.  He reports not having any pain like this since 2017 when he had his last stent.  He reports he took 3 nitroglycerin and 4 baby aspirins and went to a [employer] breakfast thinking the pain would go away.  He ate something that had onion and tomato when it which he reports he is allergic to and began to have an allergic reaction with throat itching and reported swelling and presented to the ED for evaluation.  He was given Benadryl, steroids, Pepcid in the ED for the allergic reaction.  The ED physician offered to give him epinephrine but patient declined.  He has been given morphine for the chest pain.  He reports he is still having chest pain.  EKG and troponin negative.    Chart reviewed and patient has multiple different MRNs with slightly different addresses and birthdays.  He has presented with a similar story like this multiple times.  He had an angiogram in 2018 which showed no stents  and clean coronaries.  He has left AMA multiple times from the hospital after not getting IV pain medications.    We will place the patient on observation and monitor him closely.  He will undergo cardiac workup for his chest pain.  Cardiology will be consulted.  Other MRNs  [id number]  [id number]  [id number]  [id number]  [id number] - this has the 2018 cath report - no CAD, no stents  [id number]  [id number]  [id number]  [id number]  [id number]           * No surgery found *      Hospital Course:   Observed by the hospital medicine service for chest pain.  Cardiology was consulted.  Stress test and echocardiogram were performed.  Patient refused these tests.  Patient continued to complain of pain.  Was was aggressive toward staff.  He chose to leave AMA and forgo further testing       Goals of Care Treatment Preferences:  Code Status: Full Code      Consults:     No new Assessment & Plan notes have been filed under this hospital service since the last note was generated.  Service: Hospital Medicine    Final Active Diagnoses:    Diagnosis Date Noted POA    PRINCIPAL PROBLEM:  Chest pain [R07.9] 07/01/2023 Yes    CHF (congestive heart failure) [I50.9] 07/01/2023 Yes    Type 2 diabetes mellitus, with long-term current use of insulin [E11.9, Z79.4] 07/01/2023 Not Applicable    Hyperlipidemia [E78.5] 07/01/2023 Yes    Hypertension associated with diabetes [E11.59, I15.2] 07/01/2023 Yes    CAD (coronary artery disease) [I25.10] 07/01/2023 Yes      Problems Resolved During this Admission:       Discharged Condition: against medical advice    Disposition: Left Against Medical Adv*    Follow Up:   Follow-up [facility] Follow up.    Contact information:  [place] [address] [phone]                       Patient Instructions:      Notify your health care provider if you experience any of the following:  temperature >100.4     Notify your health care provider if you experience any of the  following:  persistent nausea and vomiting or diarrhea     Notify your health care provider if you experience any of the following:  severe uncontrolled pain     Notify your health care provider if you experience any of the following:  difficulty breathing or increased cough     Notify your health care provider if you experience any of the following:  severe persistent headache     Activity as tolerated       Significant Diagnostic Studies:     Pending Diagnostic Studies:     None         Medications:  Reconciled Home Medications:      Medication List      You have not been prescribed any medications.         Indwelling Lines/Drains at time of discharge:   Lines/Drains/Airways     None                 Time spent on the discharge of patient: 35 minutes         Gogo Fonseca MD  Department of Hospital Medicine  [facility]/Surg